# Patient Record
Sex: MALE | Race: WHITE | NOT HISPANIC OR LATINO
[De-identification: names, ages, dates, MRNs, and addresses within clinical notes are randomized per-mention and may not be internally consistent; named-entity substitution may affect disease eponyms.]

---

## 2017-03-31 PROBLEM — Z00.00 ENCOUNTER FOR PREVENTIVE HEALTH EXAMINATION: Status: ACTIVE | Noted: 2017-03-31

## 2017-04-04 ENCOUNTER — FORM ENCOUNTER (OUTPATIENT)
Age: 57
End: 2017-04-04

## 2017-04-05 ENCOUNTER — APPOINTMENT (OUTPATIENT)
Dept: ORTHOPEDIC SURGERY | Facility: CLINIC | Age: 57
End: 2017-04-05
Payer: COMMERCIAL

## 2017-04-05 ENCOUNTER — OUTPATIENT (OUTPATIENT)
Dept: OUTPATIENT SERVICES | Facility: HOSPITAL | Age: 57
LOS: 1 days | End: 2017-04-05
Payer: COMMERCIAL

## 2017-04-05 DIAGNOSIS — M12.571 TRAUMATIC ARTHROPATHY, RIGHT ANKLE AND FOOT: ICD-10-CM

## 2017-04-05 DIAGNOSIS — M25.571 PAIN IN RIGHT ANKLE AND JOINTS OF RIGHT FOOT: ICD-10-CM

## 2017-04-05 DIAGNOSIS — G89.29 PAIN IN RIGHT ANKLE AND JOINTS OF RIGHT FOOT: ICD-10-CM

## 2017-04-05 PROCEDURE — 73650 X-RAY EXAM OF HEEL: CPT

## 2017-04-05 PROCEDURE — 73630 X-RAY EXAM OF FOOT: CPT

## 2017-04-05 PROCEDURE — 73630 X-RAY EXAM OF FOOT: CPT | Mod: 26,RT

## 2017-04-05 PROCEDURE — 99203 OFFICE O/P NEW LOW 30 MIN: CPT

## 2017-04-29 PROBLEM — M25.571 CHRONIC PAIN OF RIGHT ANKLE: Status: ACTIVE | Noted: 2017-04-29

## 2017-04-29 PROBLEM — M12.571 POST-TRAUMATIC ARTHRITIS OF RIGHT ANKLE: Status: ACTIVE | Noted: 2017-04-29

## 2019-04-25 ENCOUNTER — APPOINTMENT (OUTPATIENT)
Dept: HEART AND VASCULAR | Facility: CLINIC | Age: 59
End: 2019-04-25
Payer: MEDICARE

## 2019-04-25 ENCOUNTER — NON-APPOINTMENT (OUTPATIENT)
Age: 59
End: 2019-04-25

## 2019-04-25 VITALS
BODY MASS INDEX: 25.77 KG/M2 | WEIGHT: 180 LBS | DIASTOLIC BLOOD PRESSURE: 84 MMHG | HEIGHT: 70 IN | HEART RATE: 66 BPM | SYSTOLIC BLOOD PRESSURE: 132 MMHG

## 2019-04-25 DIAGNOSIS — R07.9 CHEST PAIN, UNSPECIFIED: ICD-10-CM

## 2019-04-25 DIAGNOSIS — Z82.49 FAMILY HISTORY OF ISCHEMIC HEART DISEASE AND OTHER DISEASES OF THE CIRCULATORY SYSTEM: ICD-10-CM

## 2019-04-25 DIAGNOSIS — Z78.9 OTHER SPECIFIED HEALTH STATUS: ICD-10-CM

## 2019-04-25 DIAGNOSIS — I25.84 ATHEROSCLEROTIC HEART DISEASE OF NATIVE CORONARY ARTERY W/OUT ANGINA PECTORIS: ICD-10-CM

## 2019-04-25 DIAGNOSIS — I25.10 ATHEROSCLEROTIC HEART DISEASE OF NATIVE CORONARY ARTERY W/OUT ANGINA PECTORIS: ICD-10-CM

## 2019-04-25 PROCEDURE — 99204 OFFICE O/P NEW MOD 45 MIN: CPT | Mod: 25

## 2019-04-25 PROCEDURE — 93923 UPR/LXTR ART STDY 3+ LVLS: CPT

## 2019-04-25 PROCEDURE — 93000 ELECTROCARDIOGRAM COMPLETE: CPT

## 2019-04-26 NOTE — REVIEW OF SYSTEMS
[Chest Pain] : chest pain [Leg Claudication] : intermittent leg claudication [Recent Weight Gain (___ Lbs)] : recent [unfilled] ~Ulb weight gain [Headache] : headache [Feeling Fatigued] : feeling fatigued [Eyeglasses] : currently wearing eyeglasses [Joint Pain] : joint pain [Numbness (Hypesthesia)] : numbness [Memory Lapses Or Loss] : memory lapses or loss [Negative] : Heme/Lymph [Fever] : no fever [Chills] : no chills [Recent Weight Loss (___ Lbs)] : no recent weight loss [Shortness Of Breath] : no shortness of breath [Dyspnea on exertion] : not dyspnea during exertion [Chest  Pressure] : no chest pressure [Lower Ext Edema] : no extremity edema [Palpitations] : no palpitations [Cough] : no cough [Abdominal Pain] : no abdominal pain [Nausea] : no nausea [Vomiting] : no vomiting [Hematuria] : no hematuria [Nocturia] : no nocturia [Limb Weakness (Paresis)] : no limb weakness [Tremor] : no tremor was seen [Dizziness] : no dizziness [Confusion] : no confusion was observed [Convulsions] : no convulsions [Tingling (Paresthesia)] : no tingling [Anxiety] : no anxiety [Under Stress] : not under stress

## 2019-04-26 NOTE — HISTORY OF PRESENT ILLNESS
[FreeTextEntry1] : Here to set up care for chest pain. Occasionally left sided chest pain, non radiating, no associated symptoms. last 1-2 hours. gets it both at rest and with exertion. Last one multiple year ago. Left sided chest burning with diaphoreses. evaluated in ER. trop neg., had stress echo in 2017 negative.\par he cannot be more specific\par \par does walk 3 flights of stairs at home, only limited to left thigh pain. denies sob, bravo. \par baseline limping sp 2014 after surgeries. is reporting thigh pain with ambulation, relieves with rest for the past x1 year.\par also with RLE pain after walking - when he walks fast 1/2 block\par Currently on disability for leg trauma\par \par was once on Crestor 10 but stopped secondary to muscle pain, increased BP and ha. has not been taking in > 1 year, was on Livalo with good results but he stopped it \par smokes but since Jan trying to quit\par \par Given smoker, uncontrolled HLD, calcifications noted on CT of chest son is concerned for PT. Has followed up with another cardiologist with plans for NST however pt left practice. \par \par Stress echo 2017 normal\par MADELYN 4/2019 normal\par CT 03/2019 incidental cardiac calcifications noted\par Fasting labs 03/2019 elevated LDL, TRI, CHOl, and decreased HDL

## 2019-04-26 NOTE — DISCUSSION/SUMMARY
[FreeTextEntry1] : EKG:NSR,WNL\par reviewed records; note that echo in April- normal LVEF with mild-moderate MR.mild TR\par negative MADELYN in 2017\par negative echo stress\par labs feb:\par UO=522qj%,JY=780nd%,PVT=365dc%,HDL=37mg%\par has claudication- will get MADELYN and have him see vascular\par restart Livalo for lipids and recheck labs\par has CP- negative echo stress in 2107- Sx persist and possibly worse- he cannot exercise on treadmill so will need CCTA( has coronary calcifications on CT)\par MADELYN: + for RLE PVD\par DC smoking!

## 2019-04-26 NOTE — REASON FOR VISIT
[Initial Evaluation] : an initial evaluation of [Family Member] : family member [FreeTextEntry1] : CP,Lipid evaluation

## 2019-04-26 NOTE — PHYSICAL EXAM
[Normal Appearance] : normal appearance [General Appearance - Well Developed] : well developed [General Appearance - Well Nourished] : well nourished [Heart Sounds] : normal S1 and S2 [Heart Rate And Rhythm] : heart rate and rhythm were normal [Murmurs] : no murmurs present [Edema] : no peripheral edema present [Veins - Varicosity Changes] : no varicosital changes were noted in the lower extremities [] : no respiratory distress [Respiration, Rhythm And Depth] : normal respiratory rhythm and effort [Abdomen Soft] : soft [Exaggerated Use Of Accessory Muscles For Inspiration] : no accessory muscle use [Abdomen Tenderness] : non-tender [Well Groomed] : well groomed [Normal Oral Mucosa] : normal oral mucosa [Normal Conjunctiva] : the conjunctiva exhibited no abnormalities [No Jugular Venous Odonnell A Waves] : no jugular venous odonnell A waves [Normal Jugular Venous V Waves Present] : normal jugular venous V waves present [Normal Jugular Venous A Waves Present] : normal jugular venous A waves present [Bowel Sounds] : normal bowel sounds [Skin Color & Pigmentation] : normal skin color and pigmentation [Nail Clubbing] : no clubbing of the fingernails [Oriented To Time, Place, And Person] : oriented to person, place, and time [FreeTextEntry1] : not sufficient for treadmill

## 2019-04-30 ENCOUNTER — FORM ENCOUNTER (OUTPATIENT)
Age: 59
End: 2019-04-30

## 2019-05-01 ENCOUNTER — OUTPATIENT (OUTPATIENT)
Dept: OUTPATIENT SERVICES | Facility: HOSPITAL | Age: 59
LOS: 1 days | End: 2019-05-01
Payer: COMMERCIAL

## 2019-05-01 ENCOUNTER — APPOINTMENT (OUTPATIENT)
Dept: CT IMAGING | Facility: HOSPITAL | Age: 59
End: 2019-05-01
Payer: COMMERCIAL

## 2019-05-01 PROCEDURE — 75574 CT ANGIO HRT W/3D IMAGE: CPT | Mod: 26

## 2019-05-01 PROCEDURE — 75574 CT ANGIO HRT W/3D IMAGE: CPT

## 2019-05-02 ENCOUNTER — FORM ENCOUNTER (OUTPATIENT)
Age: 59
End: 2019-05-02

## 2019-05-02 ENCOUNTER — OUTPATIENT (OUTPATIENT)
Dept: OUTPATIENT SERVICES | Facility: HOSPITAL | Age: 59
LOS: 1 days | End: 2019-05-02
Payer: COMMERCIAL

## 2019-05-02 ENCOUNTER — CLINICAL ADVICE (OUTPATIENT)
Age: 59
End: 2019-05-02

## 2019-05-03 PROCEDURE — 0502T: CPT

## 2019-05-03 PROCEDURE — 0503T: CPT

## 2019-05-08 ENCOUNTER — APPOINTMENT (OUTPATIENT)
Dept: VASCULAR SURGERY | Facility: CLINIC | Age: 59
End: 2019-05-08
Payer: COMMERCIAL

## 2019-05-08 DIAGNOSIS — M79.604 PAIN IN RIGHT LEG: ICD-10-CM

## 2019-05-08 PROCEDURE — 99204 OFFICE O/P NEW MOD 45 MIN: CPT

## 2019-05-08 PROCEDURE — 93880 EXTRACRANIAL BILAT STUDY: CPT

## 2019-05-21 ENCOUNTER — APPOINTMENT (OUTPATIENT)
Dept: VASCULAR SURGERY | Facility: CLINIC | Age: 59
End: 2019-05-21
Payer: COMMERCIAL

## 2019-05-21 ENCOUNTER — NON-APPOINTMENT (OUTPATIENT)
Age: 59
End: 2019-05-21

## 2019-05-21 ENCOUNTER — APPOINTMENT (OUTPATIENT)
Dept: HEART AND VASCULAR | Facility: CLINIC | Age: 59
End: 2019-05-21
Payer: COMMERCIAL

## 2019-05-21 VITALS
SYSTOLIC BLOOD PRESSURE: 129 MMHG | DIASTOLIC BLOOD PRESSURE: 71 MMHG | OXYGEN SATURATION: 98 % | WEIGHT: 173 LBS | BODY MASS INDEX: 24.77 KG/M2 | HEIGHT: 70 IN | HEART RATE: 67 BPM

## 2019-05-21 PROCEDURE — 99214 OFFICE O/P EST MOD 30 MIN: CPT | Mod: 25

## 2019-05-21 PROCEDURE — 93978 VASCULAR STUDY: CPT

## 2019-05-21 PROCEDURE — 93000 ELECTROCARDIOGRAM COMPLETE: CPT

## 2019-05-21 NOTE — DISCUSSION/SUMMARY
[FreeTextEntry1] : EKG:NSR\par add EASA for CAD/PVD\par f/u with dr pruitt and walk 10+ blocks daily for pVD\par cont livalo for lipids. meds renewed

## 2019-05-21 NOTE — PHYSICAL EXAM
[General Appearance - Well Developed] : well developed [Normal Appearance] : normal appearance [Well Groomed] : well groomed [General Appearance - Well Nourished] : well nourished [Normal Conjunctiva] : the conjunctiva exhibited no abnormalities [Normal Oral Mucosa] : normal oral mucosa [Normal Jugular Venous A Waves Present] : normal jugular venous A waves present [Normal Jugular Venous V Waves Present] : normal jugular venous V waves present [No Jugular Venous Odonnell A Waves] : no jugular venous odonnell A waves [] : no respiratory distress [Respiration, Rhythm And Depth] : normal respiratory rhythm and effort [Exaggerated Use Of Accessory Muscles For Inspiration] : no accessory muscle use [Auscultation Breath Sounds / Voice Sounds] : lungs were clear to auscultation bilaterally [Heart Rate And Rhythm] : heart rate and rhythm were normal [Heart Sounds] : normal S1 and S2 [Murmurs] : no murmurs present [Edema] : no peripheral edema present [FreeTextEntry1] : decreased pulse RLE [Bowel Sounds] : normal bowel sounds [Abdomen Soft] : soft [Abdomen Tenderness] : non-tender [Abnormal Walk] : normal gait [Nail Clubbing] : no clubbing of the fingernails [Skin Color & Pigmentation] : normal skin color and pigmentation [Oriented To Time, Place, And Person] : oriented to person, place, and time

## 2019-05-21 NOTE — REVIEW OF SYSTEMS
[Feeling Fatigued] : feeling fatigued [Eyeglasses] : currently wearing eyeglasses [Leg Claudication] : intermittent leg claudication [Joint Pain] : joint pain [Numbness (Hypesthesia)] : numbness [Memory Lapses Or Loss] : memory lapses or loss [Negative] : Heme/Lymph [Fever] : no fever [Headache] : no headache [Recent Weight Gain (___ Lbs)] : no recent weight gain [Chills] : no chills [Recent Weight Loss (___ Lbs)] : no recent weight loss [Shortness Of Breath] : no shortness of breath [Dyspnea on exertion] : not dyspnea during exertion [Chest  Pressure] : no chest pressure [Chest Pain] : no chest pain [Lower Ext Edema] : no extremity edema [Palpitations] : no palpitations [Cough] : no cough [Abdominal Pain] : no abdominal pain [Nausea] : no nausea [Vomiting] : no vomiting [Hematuria] : no hematuria [Nocturia] : no nocturia [Limb Weakness (Paresis)] : no limb weakness [Dizziness] : no dizziness [Tremor] : no tremor was seen [Convulsions] : no convulsions [Tingling (Paresthesia)] : no tingling [Confusion] : no confusion was observed [Anxiety] : no anxiety [Under Stress] : not under stress

## 2019-05-23 ENCOUNTER — MEDICATION RENEWAL (OUTPATIENT)
Age: 59
End: 2019-05-23

## 2019-07-08 ENCOUNTER — FORM ENCOUNTER (OUTPATIENT)
Age: 59
End: 2019-07-08

## 2019-07-09 ENCOUNTER — APPOINTMENT (OUTPATIENT)
Dept: CT IMAGING | Facility: HOSPITAL | Age: 59
End: 2019-07-09
Payer: COMMERCIAL

## 2019-07-09 ENCOUNTER — OUTPATIENT (OUTPATIENT)
Dept: OUTPATIENT SERVICES | Facility: HOSPITAL | Age: 59
LOS: 1 days | End: 2019-07-09
Payer: COMMERCIAL

## 2019-07-09 PROCEDURE — 75635 CT ANGIO ABDOMINAL ARTERIES: CPT | Mod: 26

## 2019-07-09 PROCEDURE — 75635 CT ANGIO ABDOMINAL ARTERIES: CPT

## 2019-07-09 NOTE — HISTORY OF PRESENT ILLNESS
[FreeTextEntry1] : 59 year old male former smoker (for 40yrs, <1 pack/day, quit ~1.5 weeks ago) with HLD and h/o RLE injury with calcaneus bone fracture s/p repair by Dr. Figueroa, who presents today for initial evaluation of RLE pain, referred by Dr. Wong. He reports pain in the R leg when he walks that began a few years ago. He reports that the pain used to be localized towards the groin area, thigh and calf, but now it's localized to the thigh and calf. He can walk about 3-4 blocks without pain if he walks slowly and only about 1 block if he walks quickly. He denies rest pain and ulcerations. He is not taking ASA.\par \par Patient is retired but used to work as a . He presents with his son who is an ER nurse here and has plans to attend medical school. Pt's parents passed away from kidney problems and they both had T2DM.

## 2019-07-09 NOTE — ASSESSMENT
[Arterial/Venous Disease] : arterial/venous disease [Foot care/Footwear] : foot care/footwear [FreeTextEntry1] : 58 y/o M former smoker with hx of HLD and RLE injury with calcaneus bone fracture s/p repair many yrs ago, who presents with claudication of the right leg. Previous MADELYN/PVRs reviewed and c/w RLE arterial insufficiency and occlusive disease (likely at the iliac level) given flattened waveforms throughout MADELYN of 0.61. B/l carotid US demonstrates <50% stenosis bilaterally. On physical exam, patient with no palpable RLE pulses, including no right femoral artery pulse so I suspect he has a blockage in the vessels leading to the right leg. I will ordered a CTA A/P/LEs to evaluate of the iliac arteries to further investigate possible blockage. I advised him to begin a walking regimen and to try to walk at least 10 blocks a day. He is already relatively active but should walk on a daily basis. When he begins to feel pain, he should stop and rest until the pain subsides, and then continue walking again. He will follow up after the CTA is completed.

## 2019-07-09 NOTE — DATA REVIEWED
[FreeTextEntry1] : MADELYN/PVR from Dr Wong:\par RLE with flattened waveform throughout, MADELYN of 0.69 at high thigh level, 0.63 at low thigh level, 0.62 at calf level, 0.61 and 0.72 at PT and DP level respectively. \par LLE with normal waveforms and ABIs of 1.11, 1.16, 1.08, 1.06 and 1.05 in same order mentioned for RLE.

## 2019-07-09 NOTE — ADDENDUM
[FreeTextEntry1] : This note was written by Jennifer Bui on 05/08/2019  acting as scribe for Dr. Howell.

## 2019-07-09 NOTE — END OF VISIT
[FreeTextEntry3] : All medical record entries made by the Scribe were at my, Dr. Howell's, discretion and personally dictated by me on 05/08/2019. I have reviewed the chart and agree that the record accurately reflects my personal performance of the history, physical exam, assessment and plan. I have also personally directed, reviewed and agreed to the chart.

## 2019-07-09 NOTE — PHYSICAL EXAM
[Respiratory Effort] : normal respiratory effort [Alert] : alert [Calm] : calm [2+] : left 2+ [0] : right 0 [No Rash or Lesion] : No rash or lesion [JVD] : no jugular venous distention  [Carotid Bruits] : no carotid bruits [Ankle Swelling (On Exam)] : not present [Varicose Veins Of Lower Extremities] : not present [] : not present [de-identified] : Calm, cooperative [de-identified] : NCAT [de-identified] : soft, non distended [de-identified] : FROM

## 2019-07-10 ENCOUNTER — APPOINTMENT (OUTPATIENT)
Dept: VASCULAR SURGERY | Facility: CLINIC | Age: 59
End: 2019-07-10
Payer: COMMERCIAL

## 2019-07-10 VITALS — SYSTOLIC BLOOD PRESSURE: 108 MMHG | DIASTOLIC BLOOD PRESSURE: 75 MMHG | HEART RATE: 62 BPM

## 2019-07-10 DIAGNOSIS — R09.89 OTHER SPECIFIED SYMPTOMS AND SIGNS INVOLVING THE CIRCULATORY AND RESPIRATORY SYSTEMS: ICD-10-CM

## 2019-07-10 DIAGNOSIS — I73.9 PERIPHERAL VASCULAR DISEASE, UNSPECIFIED: ICD-10-CM

## 2019-07-10 PROCEDURE — 99214 OFFICE O/P EST MOD 30 MIN: CPT

## 2019-08-07 NOTE — ASSESSMENT
Arterial/Venous Disease] : arterial/venous disease [Foot care/Footwear] : foot care/footwear [FreeTextEntry1] : 60 y/o M former smoker with hx of HLD and RLE injury with calcaneus bone fracture s/p repair many yrs ago, who presents for follow up for his RLE claudication. Previously ordered CTA A/P/LEs reviewed today shows an occluded right common iliac artery with reconstitution of the external and internal iliac arteries. I advised him to continue his walking regimen to further improve his RLE claudication. Patient was again let known on the risks of surgical intervention with arterial stents. Given his improvement, this is not warranted for now. He is encouraged to wear comfortable shoes to relieve pressure from his foot. He is further advised to see a podiatrist for this. Will return here in 3 months for his next follow up.

## 2019-08-07 NOTE — HISTORY OF PRESENT ILLNESS
[FreeTextEntry1] : 59 year old male former smoker (for 40yrs, <1 pack/day, quit ~1.5 weeks ago) with HLD and h/o RLE injury with calcaneus bone fracture s/p repair by Dr. Figueroa, who presents today for follow up evaluation of RLE claudication, referred by Dr. Wong. He reports that he now walks 6000 steps daily and the pain in the R leg has resolved. He does state that he experiences pain on his foot but associates this with his of PSHx of foot surgery. Denies rest pain and ulcerations. He reports completing his CTA yesterday and would like to review the report/images. \par \par Patient is retired but used to work as a . He presents with his son who is an ER nurse here and has plans to attend medical school. Pt's parents passed away from kidney problems and they both had T2DM.

## 2019-08-07 NOTE — END OF VISIT
[FreeTextEntry3] : All medical record entries made by the Scribe were at my, Dr. Howell's direction and personally dictated by me on 07/10/2019 I have reviewed the chart and agree that the record accurately reflects my personal performance of the history, physical exam, assessment and plan. I have also personally directed, reviewed, and agreed with the chart.\par

## 2019-08-07 NOTE — ADDENDUM
[FreeTextEntry1] : Documented by Marcela Newell acting as a scribe for Dr. Domingo Howell on 07/10/2019\par

## 2019-08-07 NOTE — PHYSICAL EXAM
[Respiratory Effort] : normal respiratory effort [Alert] : alert [No Rash or Lesion] : No rash or lesion [Calm] : calm [Oriented to Person] : oriented to person [Oriented to Place] : oriented to place [Oriented to Time] : oriented to time [JVD] : no jugular venous distention  [Ankle Swelling (On Exam)] : not present [Varicose Veins Of Lower Extremities] : not present [] : not present [de-identified] : Well appearing, NAD [de-identified] : NCAT [de-identified] : soft, non distended [de-identified] : FROM

## 2019-08-07 NOTE — DATA REVIEWED
[FreeTextEntry1] : Abdominal CTA with runoff (07/09/2019)\par Impression: Occluded right common iliac artery with reconstitution of the external and internal iliac arteries.\par \par

## 2019-08-20 ENCOUNTER — APPOINTMENT (OUTPATIENT)
Dept: HEART AND VASCULAR | Facility: CLINIC | Age: 59
End: 2019-08-20
Payer: MEDICARE

## 2019-08-20 ENCOUNTER — NON-APPOINTMENT (OUTPATIENT)
Age: 59
End: 2019-08-20

## 2019-08-20 VITALS
DIASTOLIC BLOOD PRESSURE: 78 MMHG | WEIGHT: 167 LBS | HEIGHT: 70 IN | SYSTOLIC BLOOD PRESSURE: 118 MMHG | BODY MASS INDEX: 23.91 KG/M2

## 2019-08-20 VITALS — HEART RATE: 60 BPM

## 2019-08-20 PROCEDURE — 99214 OFFICE O/P EST MOD 30 MIN: CPT | Mod: 25

## 2019-08-20 PROCEDURE — 93000 ELECTROCARDIOGRAM COMPLETE: CPT

## 2019-08-20 NOTE — PHYSICAL EXAM
[Normal Appearance] : normal appearance [General Appearance - Well Developed] : well developed [Well Groomed] : well groomed [Normal Conjunctiva] : the conjunctiva exhibited no abnormalities [General Appearance - Well Nourished] : well nourished [Normal Oral Mucosa] : normal oral mucosa [Normal Jugular Venous A Waves Present] : normal jugular venous A waves present [Normal Jugular Venous V Waves Present] : normal jugular venous V waves present [No Jugular Venous Odonnell A Waves] : no jugular venous odonnell A waves [] : no respiratory distress [Respiration, Rhythm And Depth] : normal respiratory rhythm and effort [Exaggerated Use Of Accessory Muscles For Inspiration] : no accessory muscle use [Auscultation Breath Sounds / Voice Sounds] : lungs were clear to auscultation bilaterally [Heart Rate And Rhythm] : heart rate and rhythm were normal [Heart Sounds] : normal S1 and S2 [Murmurs] : no murmurs present [Edema] : no peripheral edema present [FreeTextEntry1] : decreased pulse RLE [Bowel Sounds] : normal bowel sounds [Abdomen Soft] : soft [Abdomen Tenderness] : non-tender [Abnormal Walk] : normal gait [Nail Clubbing] : no clubbing of the fingernails [Skin Color & Pigmentation] : normal skin color and pigmentation [Oriented To Time, Place, And Person] : oriented to person, place, and time

## 2019-08-20 NOTE — REVIEW OF SYSTEMS
[Fever] : no fever [Headache] : no headache [Recent Weight Gain (___ Lbs)] : no recent weight gain [Feeling Fatigued] : not feeling fatigued [Chills] : no chills [Recent Weight Loss (___ Lbs)] : no recent weight loss [Eyeglasses] : currently wearing eyeglasses [Shortness Of Breath] : no shortness of breath [see HPI] : see HPI [Dyspnea on exertion] : not dyspnea during exertion [Chest Pain] : no chest pain [Chest  Pressure] : no chest pressure [Lower Ext Edema] : no extremity edema [Leg Claudication] : no intermittent leg claudication [Palpitations] : no palpitations [Cough] : no cough [Abdominal Pain] : no abdominal pain [Nausea] : no nausea [Vomiting] : no vomiting [Hematuria] : no hematuria [Nocturia] : no nocturia [Joint Pain] : joint pain [Limb Weakness (Paresis)] : no limb weakness [Dizziness] : no dizziness [Tremor] : no tremor was seen [Convulsions] : no convulsions [Numbness (Hypesthesia)] : numbness [Tingling (Paresthesia)] : no tingling [Confusion] : no confusion was observed [Memory Lapses Or Loss] : memory lapses or loss [Anxiety] : no anxiety [Under Stress] : not under stress [Negative] : Heme/Lymph

## 2019-08-23 ENCOUNTER — MEDICATION RENEWAL (OUTPATIENT)
Age: 59
End: 2019-08-23

## 2019-12-03 ENCOUNTER — APPOINTMENT (OUTPATIENT)
Dept: HEART AND VASCULAR | Facility: CLINIC | Age: 59
End: 2019-12-03
Payer: COMMERCIAL

## 2019-12-03 ENCOUNTER — NON-APPOINTMENT (OUTPATIENT)
Age: 59
End: 2019-12-03

## 2019-12-03 VITALS
HEIGHT: 70 IN | HEART RATE: 61 BPM | BODY MASS INDEX: 24.48 KG/M2 | WEIGHT: 171 LBS | SYSTOLIC BLOOD PRESSURE: 120 MMHG | DIASTOLIC BLOOD PRESSURE: 82 MMHG

## 2019-12-03 DIAGNOSIS — I73.9 PERIPHERAL VASCULAR DISEASE, UNSPECIFIED: ICD-10-CM

## 2019-12-03 PROCEDURE — 93306 TTE W/DOPPLER COMPLETE: CPT

## 2019-12-03 PROCEDURE — 93000 ELECTROCARDIOGRAM COMPLETE: CPT

## 2019-12-03 PROCEDURE — 99214 OFFICE O/P EST MOD 30 MIN: CPT | Mod: 25

## 2019-12-03 RX ORDER — AZITHROMYCIN 250 MG/1
250 TABLET, FILM COATED ORAL
Qty: 6 | Refills: 0 | Status: DISCONTINUED | COMMUNITY
Start: 2019-03-21 | End: 2019-12-03

## 2019-12-03 NOTE — HISTORY OF PRESENT ILLNESS
[FreeTextEntry1] : saw Dr Howell for PVD- no c/o claudication\par no cp,sob\par had labs 2 weeks ago

## 2019-12-03 NOTE — REVIEW OF SYSTEMS
[Recent Weight Gain (___ Lbs)] : recent [unfilled] ~Ulb weight gain [Eyeglasses] : currently wearing eyeglasses [see HPI] : see HPI [Nocturia] : nocturia [Joint Pain] : joint pain [Lower Back Pain] : lower back pain [Ankle Problem] : ankle problems [Numbness (Hypesthesia)] : numbness [Memory Lapses Or Loss] : memory lapses or loss [Negative] : Heme/Lymph [Fever] : no fever [Headache] : no headache [Chills] : no chills [Feeling Fatigued] : not feeling fatigued [Recent Weight Loss (___ Lbs)] : no recent weight loss [Shortness Of Breath] : no shortness of breath [Chest  Pressure] : no chest pressure [Dyspnea on exertion] : not dyspnea during exertion [Lower Ext Edema] : no extremity edema [Chest Pain] : no chest pain [Palpitations] : no palpitations [Leg Claudication] : no intermittent leg claudication [Nausea] : no nausea [Abdominal Pain] : no abdominal pain [Cough] : no cough [Vomiting] : no vomiting [Hematuria] : no hematuria [Dizziness] : no dizziness [Limb Weakness (Paresis)] : no limb weakness [Tremor] : no tremor was seen [Tingling (Paresthesia)] : no tingling [Convulsions] : no convulsions [Confusion] : no confusion was observed [Anxiety] : no anxiety [Under Stress] : not under stress

## 2019-12-03 NOTE — PHYSICAL EXAM
[General Appearance - Well Developed] : well developed [Normal Appearance] : normal appearance [Well Groomed] : well groomed [Normal Oral Mucosa] : normal oral mucosa [General Appearance - Well Nourished] : well nourished [Normal Conjunctiva] : the conjunctiva exhibited no abnormalities [Normal Jugular Venous A Waves Present] : normal jugular venous A waves present [Normal Jugular Venous V Waves Present] : normal jugular venous V waves present [No Jugular Venous Odonnell A Waves] : no jugular venous odonnell A waves [] : no respiratory distress [Respiration, Rhythm And Depth] : normal respiratory rhythm and effort [Exaggerated Use Of Accessory Muscles For Inspiration] : no accessory muscle use [Auscultation Breath Sounds / Voice Sounds] : lungs were clear to auscultation bilaterally [Heart Rate And Rhythm] : heart rate and rhythm were normal [Heart Sounds] : normal S1 and S2 [Edema] : no peripheral edema present [Murmurs] : no murmurs present [Abdomen Tenderness] : non-tender [Abdomen Soft] : soft [Bowel Sounds] : normal bowel sounds [Nail Clubbing] : no clubbing of the fingernails [Abnormal Walk] : normal gait [Oriented To Time, Place, And Person] : oriented to person, place, and time [Skin Color & Pigmentation] : normal skin color and pigmentation [FreeTextEntry1] : intact LLE pulse/ absent RLE pulse- feet warm

## 2019-12-03 NOTE — DISCUSSION/SUMMARY
[FreeTextEntry1] : EKG:NSR\par reviewed labs done 11/20: CA=564ko%,DWX=522 mg%\par pt states was off his diet and was off Livalo for a week\par advised cont Livalo and repeat labs in 6-8 weeks( gave rx for labs)\par  cont walking and f/u with Dr alexandra for PVD( denies claudication now)\par asa for elevated calcium score- states had side effects with lipitor in past\par meds renewed\par echo:\par normal LVEF, mild-moderate MR

## 2019-12-10 ENCOUNTER — RESULT REVIEW (OUTPATIENT)
Age: 59
End: 2019-12-10

## 2020-05-20 ENCOUNTER — APPOINTMENT (OUTPATIENT)
Dept: VASCULAR SURGERY | Facility: CLINIC | Age: 60
End: 2020-05-20

## 2020-08-10 ENCOUNTER — APPOINTMENT (OUTPATIENT)
Dept: HEART AND VASCULAR | Facility: CLINIC | Age: 60
End: 2020-08-10

## 2020-10-28 ENCOUNTER — APPOINTMENT (OUTPATIENT)
Dept: VASCULAR SURGERY | Facility: CLINIC | Age: 60
End: 2020-10-28
Payer: COMMERCIAL

## 2020-10-28 DIAGNOSIS — R20.0 ANESTHESIA OF SKIN: ICD-10-CM

## 2020-10-28 DIAGNOSIS — R20.2 ANESTHESIA OF SKIN: ICD-10-CM

## 2020-10-28 PROCEDURE — 99072 ADDL SUPL MATRL&STAF TM PHE: CPT

## 2020-10-28 PROCEDURE — 99214 OFFICE O/P EST MOD 30 MIN: CPT

## 2020-11-02 NOTE — ASSESSMENT
[FreeTextEntry1] : 61 y/o M w/ mild PAD w/ occluded right common iliac artery with reconstitution of the external and internal iliac arteries, asymptomatic. On exam, general exam benign. Discussed with patient his b/l upper extremity symptoms are likely secondary to a nerve impingement or neuropathy, not of vascular etiology. I have recommended pt see a neurologist, Dr Gordon. Recommended pt to continue to stay active, daily walking and to follow up with us here in 1 month.

## 2020-11-02 NOTE — HISTORY OF PRESENT ILLNESS
[FreeTextEntry1] : 61 y/o M former smoker (for 40yrs, <1 pack/day, quit ~1.5 weeks ago) with HLD, h/o RLE injury with calcaneus bone fracture s/p repair by Dr. Figueroa, hx of chronic back issues, and PAD, RLE claudication. Patient was originally referred by Dr. Wong. Today, he reports his RLE claudication symptoms have resolved. During the summer he reports walking ~>10 blocks/day without stopping and lost some weight. However, pt reports he has not remained as active recently and c/o intermittent numbness sensation to b/l soles of his feet. Also, pt reports constant burning, coldness, numbness/ tingling sensation to the b/l hands with intermittent swelling since ~1 week ago. He does report at times, depending on the position he sleeps in, he also experiences some neck discomfort or pain. Denies any recent trauma or falls, fevers, chills, skin changes, ulcerations, leg swelling, rest pain or claudication. \par \par Pt is accompanied by wife. Patient reports his son who was an ER Nurse just started attending Medical School. \par \par SHx:\par Patient is retired but used to work as a . \par \par FHx:\par Pt's parents passed away from kidney problems and they both had T2DM.\par

## 2020-11-02 NOTE — PHYSICAL EXAM
[Respiratory Effort] : normal respiratory effort [No Rash or Lesion] : No rash or lesion [Alert] : alert [Oriented to Person] : oriented to person [Oriented to Place] : oriented to place [Oriented to Time] : oriented to time [Calm] : calm [Normal Rate and Rhythm] : normal rate and rhythm [2+] : left 2+ [JVD] : no jugular venous distention  [Ankle Swelling (On Exam)] : not present [Varicose Veins Of Lower Extremities] : not present [] : not present [de-identified] : Well appearing, NAD [de-identified] : NCAT [de-identified] : Supple [de-identified] : soft, non distended [de-identified] : FROM

## 2020-11-02 NOTE — ADDENDUM
[FreeTextEntry1] : This note was written by Dayana Flores on 10/28/2020 acting as scribe for Domingo Ashley M.D.\par \par I, Domingo Snow  have read and attest that all the information, medical decision making and discharge instructions within are true and accurate.

## 2020-11-06 ENCOUNTER — APPOINTMENT (OUTPATIENT)
Dept: NEUROLOGY | Facility: CLINIC | Age: 60
End: 2020-11-06
Payer: COMMERCIAL

## 2020-11-06 VITALS
BODY MASS INDEX: 23.34 KG/M2 | WEIGHT: 163 LBS | SYSTOLIC BLOOD PRESSURE: 120 MMHG | HEIGHT: 70 IN | HEART RATE: 58 BPM | DIASTOLIC BLOOD PRESSURE: 75 MMHG

## 2020-11-06 DIAGNOSIS — F17.200 NICOTINE DEPENDENCE, UNSPECIFIED, UNCOMPLICATED: ICD-10-CM

## 2020-11-06 PROCEDURE — 99205 OFFICE O/P NEW HI 60 MIN: CPT

## 2020-11-06 NOTE — PHYSICAL EXAM
[General Appearance - Alert] : alert [General Appearance - In No Acute Distress] : in no acute distress [Person] : oriented to person [Place] : oriented to place [Time] : oriented to time [Short Term Intact] : short term memory intact [Remote Intact] : remote memory intact [Registration Intact] : recent registration memory intact [Concentration Intact] : normal concentrating ability [Visual Intact] : visual attention was ~T not ~L decreased [Naming Objects] : no difficulty naming common objects [Repeating Phrases] : no difficulty repeating a phrase [Writing A Sentence] : no difficulty writing a sentence [Fluency] : fluency intact [Comprehension] : comprehension intact [Reading] : reading intact [Past History] : adequate knowledge of personal past history [Cranial Nerves Optic (II)] : visual acuity intact bilaterally,  visual fields full to confrontation, pupils equal round and reactive to light [Cranial Nerves Oculomotor (III)] : extraocular motion intact [Cranial Nerves Trigeminal (V)] : facial sensation intact symmetrically [Cranial Nerves Facial (VII)] : face symmetrical [Cranial Nerves Vestibulocochlear (VIII)] : hearing was intact bilaterally [Cranial Nerves Glossopharyngeal (IX)] : tongue and palate midline [Cranial Nerves Accessory (XI - Cranial And Spinal)] : head turning and shoulder shrug symmetric [Cranial Nerves Hypoglossal (XII)] : there was no tongue deviation with protrusion [Motor Strength] : muscle strength was normal in all four extremities [No Muscle Atrophy] : normal bulk in all four extremities [Motor Handedness Right-Handed] : the patient is right hand dominant [Sensation Tactile Decrease] : light touch was intact [Vibration Decrease Leg / Foot Both Ankles] : decreased at both ankles [Vibration Decrease Leg / Foot Toes Both Feet] : decreased at the toes of both feet  [Position Sensation Decrease Leg/Foot At Level Of Toes] : impaired at the toes in both legs [Abnormal Walk] : normal gait [Balance] : balance was intact [Past-pointing] : there was no past-pointing [Tremor] : no tremor present [4+] : Patella left 4+ [2+] : Ankle jerk right 2+ [3+] : Ankle jerk left 3+ [Plantar Reflex Right Only] : normal on the right [Plantar Reflex Left Only] : abnormal on the left [FreeTextEntry6] : slightly increased tone LE's [FreeTextEntry7] : vib loss to ASIS

## 2020-11-06 NOTE — HISTORY OF PRESENT ILLNESS
[FreeTextEntry1] : Patient presents for evaluation of burning sensation.  He states that about 4-5 weeks ago he started feeling burning and tingling with some numbness bilateral dorsal aspect of hands, more toward medial side.  Palmar side normal.  About the same time he started having similar symptoms soles of feet but much less notable.  This has happened for the last 4-5 autumns and through the winter months but not as severe.  He has some chronic neck pain but minimizes it, worse in the morning after sleeping.  \par \par Diclofenac helped recently.  \par \par He denies weakness of hands or legs.  Denies significant FH.

## 2020-11-06 NOTE — ASSESSMENT
[FreeTextEntry1] : Patient's presentation and examination is most consistent with cervical myelopathy likely due to spondylosis. \par \par Will get MRI C spine and check Vit B12, MMA, homocysteine, folate, copper\par \par Mobic 15mg QD\par \par I will call with results.

## 2020-11-07 LAB
FOLATE SERPL-MCNC: 12.9 NG/ML
HCYS SERPL-MCNC: 16 UMOL/L
VIT B12 SERPL-MCNC: 547 PG/ML

## 2020-11-10 ENCOUNTER — APPOINTMENT (OUTPATIENT)
Dept: NEUROLOGY | Facility: CLINIC | Age: 60
End: 2020-11-10

## 2020-11-11 LAB — COPPER SERPL-MCNC: 100 UG/DL

## 2020-11-13 ENCOUNTER — APPOINTMENT (OUTPATIENT)
Dept: NEUROLOGY | Facility: CLINIC | Age: 60
End: 2020-11-13

## 2020-11-13 LAB — METHYLMALONATE SERPL-SCNC: 137 NMOL/L

## 2020-11-18 ENCOUNTER — RESULT REVIEW (OUTPATIENT)
Age: 60
End: 2020-11-18

## 2020-11-18 ENCOUNTER — APPOINTMENT (OUTPATIENT)
Dept: MRI IMAGING | Facility: CLINIC | Age: 60
End: 2020-11-18
Payer: COMMERCIAL

## 2020-11-18 ENCOUNTER — OUTPATIENT (OUTPATIENT)
Dept: OUTPATIENT SERVICES | Facility: HOSPITAL | Age: 60
LOS: 1 days | End: 2020-11-18

## 2020-11-18 PROCEDURE — 72141 MRI NECK SPINE W/O DYE: CPT | Mod: 26

## 2020-11-20 ENCOUNTER — TRANSCRIPTION ENCOUNTER (OUTPATIENT)
Age: 60
End: 2020-11-20

## 2020-12-01 ENCOUNTER — OUTPATIENT (OUTPATIENT)
Dept: OUTPATIENT SERVICES | Facility: HOSPITAL | Age: 60
LOS: 1 days | End: 2020-12-01
Payer: COMMERCIAL

## 2020-12-01 ENCOUNTER — APPOINTMENT (OUTPATIENT)
Dept: ORTHOPEDIC SURGERY | Facility: CLINIC | Age: 60
End: 2020-12-01
Payer: COMMERCIAL

## 2020-12-01 ENCOUNTER — RESULT REVIEW (OUTPATIENT)
Age: 60
End: 2020-12-01

## 2020-12-01 VITALS
BODY MASS INDEX: 23.19 KG/M2 | HEIGHT: 70 IN | HEART RATE: 70 BPM | DIASTOLIC BLOOD PRESSURE: 68 MMHG | WEIGHT: 162 LBS | TEMPERATURE: 97 F | OXYGEN SATURATION: 98 % | SYSTOLIC BLOOD PRESSURE: 110 MMHG

## 2020-12-01 PROCEDURE — 72050 X-RAY EXAM NECK SPINE 4/5VWS: CPT

## 2020-12-01 PROCEDURE — 99204 OFFICE O/P NEW MOD 45 MIN: CPT

## 2020-12-01 PROCEDURE — 72050 X-RAY EXAM NECK SPINE 4/5VWS: CPT | Mod: 26

## 2020-12-04 NOTE — PHYSICAL EXAM
[de-identified] : Physical Exam:\par \par General: patient is well developed, well nourished, in no acute \par distress, alert and oriented x 3. \par \par Mood and affect: normal\par \par Respiratory: no respiratory distress noted\par \par Skin: no scars over spine, skin intact, no erythema, increased warmth\par \par Alignment:The spine is well compensated in the coronal and sagittal plane. There is no asymmetry on Christine Forward Bend Test.\par \par Gait: The patient is able to toe walk and heel walk without difficulty. The patient is able to tandem walk without difficulty.\par \par Palpation: no tenderness to palpation midline along spine or paraspinal region\par \par Range of motion: Cervical and Lumbar spine ROM is full\par \par Neurologic Exam:\par Motor: Manual Muscle testing in the upper and lower extremities is 5 out of 5 in all muscle groups. There is no evidence of muscular atrophy in the upper extremities. Sensory: Sensation to light touch is grossly intact in the upper and lower extremities\par \par Reflexes: DTR are present and symmetric throughout, negative humphrey bilaterally, negative inverted radial reflex bilaterally, no clonus, plantar responses are flexor\par \par Special tests: Spurlings sign absent. Lhermitte's sign is absent. Straight Leg Raise Negative, Cross Straight Leg Raise Negative, DEEP Test Negative\par \par Hip Exam: Full painless ROM of bilateral hips\par \par Vascular: Examination of the peripheral vascular system demonstrates no evidence of congestion or edema. no lymphedema bilateral lower extremities, pulses are present and symmetric in both lower extremities.\par \par \par  [de-identified] : MRI cervical 11/2020: C4/C5 and C5/C6 disc osteophyte and posterior element hypertrophy resulting in severe central canal stenosis and cord compression, moderate/severe bilateral foraminal stenosis; cord signal change at these levels consistent with myelomalacia

## 2020-12-04 NOTE — HISTORY OF PRESENT ILLNESS
[de-identified] : Mr. DE LEON is a very pleasant 60 year old male who complains of stiffness and burning sensation in right greater than left hand, intermittently for years, now constand over the past 2 months. Also with neck pain, nonradiating. \par \par The patient reports no loss of hand dexterity.\par The patient states there no loss of balance when walking.\par There no sensory loss in the arms or legs\par The patent no difficulty with urination.\par \par The patient no history of previous spine surgery.\par \par The patient has no history of unexpected weight loss, no history of active cancer, no history bladder or bowel dysfunction, no night pain, no fevers or chills.\par \par The past medical history, surgical history, family history, allergies, medications, 10+ point review of systems, family history and social history were reviewed and non contributory.\par \par

## 2020-12-04 NOTE — DISCUSSION/SUMMARY
[de-identified] : Discussed the results of the patient's history, physical exam, and imaging. Mr. Kaye has been suffering from neck pain and stiffness/burning paresthesias intermittently for years, now more constant over the last 2 months. MRI cervical shows C4/C5 and C5/C6 disc osteophyte and posterior element hypertrophy resulting in severe central canal stenosis and cord compression, moderate/severe bilateral foraminal stenosis; cord signal change at these levels consistent with myelomalacia. Explained to patient that this will require surgical decompression. I am recommending a CT cervical without contrast, to be completed this week. The patient will follow up with me after imaging is completed for surgical planning, sooner if there is an issue. Will discuss anterior vs posterior surgical approach pending results of CT cervical. All questions answered.

## 2020-12-09 ENCOUNTER — APPOINTMENT (OUTPATIENT)
Dept: SPINE | Facility: CLINIC | Age: 60
End: 2020-12-09

## 2020-12-09 ENCOUNTER — APPOINTMENT (OUTPATIENT)
Dept: CT IMAGING | Facility: IMAGING CENTER | Age: 60
End: 2020-12-09
Payer: COMMERCIAL

## 2020-12-09 ENCOUNTER — OUTPATIENT (OUTPATIENT)
Dept: OUTPATIENT SERVICES | Facility: HOSPITAL | Age: 60
LOS: 1 days | End: 2020-12-09
Payer: COMMERCIAL

## 2020-12-09 DIAGNOSIS — M48.02 SPINAL STENOSIS, CERVICAL REGION: ICD-10-CM

## 2020-12-09 PROCEDURE — 72125 CT NECK SPINE W/O DYE: CPT

## 2020-12-09 PROCEDURE — 72125 CT NECK SPINE W/O DYE: CPT | Mod: 26

## 2020-12-16 ENCOUNTER — APPOINTMENT (OUTPATIENT)
Dept: ORTHOPEDIC SURGERY | Facility: CLINIC | Age: 60
End: 2020-12-16
Payer: COMMERCIAL

## 2020-12-16 PROCEDURE — 99215 OFFICE O/P EST HI 40 MIN: CPT | Mod: 57

## 2020-12-16 PROCEDURE — 99072 ADDL SUPL MATRL&STAF TM PHE: CPT

## 2020-12-18 ENCOUNTER — NON-APPOINTMENT (OUTPATIENT)
Age: 60
End: 2020-12-18

## 2020-12-21 NOTE — HISTORY OF PRESENT ILLNESS
[de-identified] : Follow up 12/16/20: Patient presents for follow up. Continues to have stiffness and burning sensation in right greater than left hand. Also with neck pain, nonradiating. Denies new neurologic symptoms. Here to review imaging.\par \par Initial 12/1/20: Mr. DE LEON is a very pleasant 60 year old male who complains of stiffness and burning sensation in right greater than left hand, intermittently for years, now constant over the past 2 months. Also with neck pain, nonradiating. \par \par The patient reports no loss of hand dexterity.\par The patient states there no loss of balance when walking.\par There no sensory loss in the arms or legs\par The patent no difficulty with urination.\par \par The patient no history of previous spine surgery.\par \par The patient has no history of unexpected weight loss, no history of active cancer, no history bladder or bowel dysfunction, no night pain, no fevers or chills.\par \par The past medical history, surgical history, family history, allergies, medications, 10+ point review of systems, family history and social history were reviewed and non contributory.

## 2020-12-21 NOTE — PHYSICAL EXAM
[de-identified] : Physical Exam:\par \par General: patient is well developed, well nourished, in no acute \par distress, alert and oriented x 3. \par \par Mood and affect: normal\par \par Respiratory: no respiratory distress noted\par \par Skin: no scars over spine, skin intact, no erythema, increased warmth\par \par Alignment:The spine is well compensated in the coronal and sagittal plane. There is no asymmetry on Christine Forward Bend Test.\par \par Gait: The patient is able to toe walk and heel walk without difficulty. The patient is able to tandem walk without difficulty.\par \par Palpation: no tenderness to palpation midline along spine or paraspinal region\par \par Range of motion: Cervical and Lumbar spine ROM is full\par \par Neurologic Exam:\par Motor: Manual Muscle testing in the upper and lower extremities is 5 out of 5 in all muscle groups. There is no evidence of muscular atrophy in the upper extremities. Sensory: Sensation to light touch is grossly intact in the upper and lower extremities\par \par Reflexes: DTR are present and symmetric throughout, negative humphrey bilaterally, negative inverted radial reflex bilaterally, no clonus, plantar responses are flexor\par \par Special tests: Spurlings sign absent. Lhermitte's sign is absent. Straight Leg Raise Negative, Cross Straight Leg Raise Negative, DEEP Test Negative\par \par Hip Exam: Full painless ROM of bilateral hips\par \par Vascular: Examination of the peripheral vascular system demonstrates no evidence of congestion or edema. no lymphedema bilateral lower extremities, pulses are present and symmetric in both lower extremities.\par  [de-identified] : CT cervical 12/2020 ([facility]): multilevel degenerative changes, most pronounced at C4/C5 and C5/C6 with severe central canal and moderate/severe bilateral foraminal stenosis; no fractures seen\par \par MRI cervical 11/2020 ([facility]): C4/C5 and C5/C6 disc osteophyte and posterior element hypertrophy resulting in severe central canal stenosis and cord compression, moderate/severe bilateral foraminal stenosis; cord signal change at these levels consistent with myelomalacia.

## 2020-12-24 ENCOUNTER — APPOINTMENT (OUTPATIENT)
Dept: ENDOCRINOLOGY | Facility: CLINIC | Age: 60
End: 2020-12-24

## 2020-12-28 ENCOUNTER — OUTPATIENT (OUTPATIENT)
Dept: OUTPATIENT SERVICES | Facility: HOSPITAL | Age: 60
LOS: 1 days | End: 2020-12-28
Payer: COMMERCIAL

## 2020-12-28 ENCOUNTER — APPOINTMENT (OUTPATIENT)
Dept: OTOLARYNGOLOGY | Facility: CLINIC | Age: 60
End: 2020-12-28
Payer: COMMERCIAL

## 2020-12-28 ENCOUNTER — RESULT REVIEW (OUTPATIENT)
Age: 60
End: 2020-12-28

## 2020-12-28 VITALS
OXYGEN SATURATION: 98 % | SYSTOLIC BLOOD PRESSURE: 159 MMHG | TEMPERATURE: 97.3 F | BODY MASS INDEX: 23.19 KG/M2 | HEART RATE: 77 BPM | WEIGHT: 162 LBS | DIASTOLIC BLOOD PRESSURE: 97 MMHG | HEIGHT: 70 IN

## 2020-12-28 PROCEDURE — 31575 DIAGNOSTIC LARYNGOSCOPY: CPT

## 2020-12-28 PROCEDURE — 71046 X-RAY EXAM CHEST 2 VIEWS: CPT

## 2020-12-28 PROCEDURE — 99203 OFFICE O/P NEW LOW 30 MIN: CPT | Mod: 25

## 2020-12-28 PROCEDURE — 71046 X-RAY EXAM CHEST 2 VIEWS: CPT | Mod: 26

## 2020-12-28 PROCEDURE — 99072 ADDL SUPL MATRL&STAF TM PHE: CPT

## 2020-12-28 NOTE — HISTORY OF PRESENT ILLNESS
[de-identified] : 59yo M, referred by Dr. Frey. Pt. is planned for ACDF C4-7. He denies previous neck surgeries, denies voice change. complains of stiffness and burning sensation in right greater than left hand, intermittently for years.

## 2020-12-28 NOTE — PROCEDURE
[Image(s) Captured] : image(s) captured and filed [Unable to Cooperate with Mirror] : patient unable to cooperate with mirror [None] : none [Flexible Endoscope] : examined with the flexible endoscope [de-identified] : Flexible fiberoptic laryngoscope advanced orally, tonsillar pillars and tonsils/tonsil bed visualized, oropharyngeal and hypopharyngeal walls and BOT, epiglottis all visualized, no masses, no fungating/ulcerating lesions evident; larynx visualized, true vocal cords abduct and adduct and meet in the midline no leuko/erythroplakic lesions . No pooling of secretions. [de-identified] : pre-op evaluation of RLN function

## 2020-12-28 NOTE — PHYSICAL EXAM
[Normal] : external appearance is normal [Laryngoscopy Performed] : laryngoscopy was performed, see procedure section for findings

## 2020-12-28 NOTE — ASSESSMENT
[FreeTextEntry1] : A 61yo male, referred by Dr. Frey for pre-ACDF evaluation. We thoroughly discussed our part of the operation, discussed risks and complications including bleeding, infection, Recurrent laryngeal nerve and other neurovascular injuries. \par plan:\par - OR ACDF (Dr. Frey 1/21/2021)\par - RTC sooner should any worrisome symptoms develop.\par - Pt verbalized understanding of above.\par

## 2020-12-28 NOTE — REVIEW OF SYSTEMS
[Patient Intake Form Reviewed] : Patient intake form was reviewed [As noted in HPI] : as noted in HPI [As Noted in HPI] : as noted in HPI [Negative] : Heme/Lymph

## 2021-01-04 ENCOUNTER — NON-APPOINTMENT (OUTPATIENT)
Age: 61
End: 2021-01-04

## 2021-01-04 ENCOUNTER — APPOINTMENT (OUTPATIENT)
Dept: HEART AND VASCULAR | Facility: CLINIC | Age: 61
End: 2021-01-04
Payer: COMMERCIAL

## 2021-01-04 ENCOUNTER — APPOINTMENT (OUTPATIENT)
Dept: HEART AND VASCULAR | Facility: CLINIC | Age: 61
End: 2021-01-04
Payer: MEDICARE

## 2021-01-04 VITALS
DIASTOLIC BLOOD PRESSURE: 82 MMHG | BODY MASS INDEX: 23.62 KG/M2 | TEMPERATURE: 97.4 F | WEIGHT: 165 LBS | SYSTOLIC BLOOD PRESSURE: 142 MMHG | HEIGHT: 70 IN | HEART RATE: 66 BPM

## 2021-01-04 DIAGNOSIS — Z01.810 ENCOUNTER FOR PREPROCEDURAL CARDIOVASCULAR EXAMINATION: ICD-10-CM

## 2021-01-04 PROCEDURE — 99214 OFFICE O/P EST MOD 30 MIN: CPT | Mod: 25

## 2021-01-04 PROCEDURE — 93000 ELECTROCARDIOGRAM COMPLETE: CPT | Mod: NC

## 2021-01-04 PROCEDURE — 93306 TTE W/DOPPLER COMPLETE: CPT

## 2021-01-04 RX ORDER — PITAVASTATIN CALCIUM 4.18 MG/1
4 TABLET, FILM COATED ORAL
Qty: 1 | Refills: 1 | Status: DISCONTINUED | COMMUNITY
Start: 2019-04-25 | End: 2021-01-04

## 2021-01-04 RX ORDER — MELOXICAM 15 MG/1
15 TABLET ORAL
Qty: 30 | Refills: 3 | Status: DISCONTINUED | COMMUNITY
Start: 2020-11-09 | End: 2021-01-04

## 2021-01-04 NOTE — HISTORY OF PRESENT ILLNESS
[FreeTextEntry1] : no cp,sob,claudication- has UE numbness and found to have C/S disease- he did not increase Livalo( he felt 4mg was too much)

## 2021-01-04 NOTE — DISCUSSION/SUMMARY
[FreeTextEntry1] : EKG:nsr,WNL\par echo;normal LVEF,mild MR\par Reviewed RECENT LABS(last week) - WYD=701FH%( TARGET GIVEN CAD IS < 70MG)- WOULD INCREASE Livalo  for lipids/CAD;asa for CAD\par His cardiac status is stable enough to permit cervical spine surgery

## 2021-01-04 NOTE — PHYSICAL EXAM
[General Appearance - Well Developed] : well developed [Normal Appearance] : normal appearance [Well Groomed] : well groomed [General Appearance - Well Nourished] : well nourished [Normal Conjunctiva] : the conjunctiva exhibited no abnormalities [Normal Oral Mucosa] : normal oral mucosa [Normal Jugular Venous A Waves Present] : normal jugular venous A waves present [Normal Jugular Venous V Waves Present] : normal jugular venous V waves present [No Jugular Venous Odonnell A Waves] : no jugular venous odonnell A waves [] : no respiratory distress [Respiration, Rhythm And Depth] : normal respiratory rhythm and effort [Exaggerated Use Of Accessory Muscles For Inspiration] : no accessory muscle use [Auscultation Breath Sounds / Voice Sounds] : lungs were clear to auscultation bilaterally [Heart Rate And Rhythm] : heart rate and rhythm were normal [Heart Sounds] : normal S1 and S2 [Murmurs] : no murmurs present [Edema] : no peripheral edema present [Bowel Sounds] : normal bowel sounds [Abdomen Soft] : soft [Abdomen Tenderness] : non-tender [Abnormal Walk] : normal gait [Nail Clubbing] : no clubbing of the fingernails [Skin Color & Pigmentation] : normal skin color and pigmentation [Oriented To Time, Place, And Person] : oriented to person, place, and time [FreeTextEntry1] : decreased pulse RLE

## 2021-01-04 NOTE — REVIEW OF SYSTEMS
[Eyeglasses] : currently wearing eyeglasses [Nocturia] : nocturia [Ankle Problem] : ankle problems [Numbness (Hypesthesia)] : numbness [Negative] : Heme/Lymph [Fever] : no fever [Headache] : no headache [Recent Weight Gain (___ Lbs)] : no recent weight gain [Chills] : no chills [Feeling Fatigued] : not feeling fatigued [Recent Weight Loss (___ Lbs)] : no recent weight loss [Shortness Of Breath] : no shortness of breath [Dyspnea on exertion] : not dyspnea during exertion [Chest  Pressure] : no chest pressure [Chest Pain] : no chest pain [Lower Ext Edema] : no extremity edema [Leg Claudication] : no intermittent leg claudication [Palpitations] : no palpitations [Cough] : no cough [Abdominal Pain] : no abdominal pain [Nausea] : no nausea [Vomiting] : no vomiting [Hematuria] : no hematuria [Joint Pain] : no joint pain [Limb Weakness (Paresis)] : no limb weakness [Dizziness] : no dizziness [Tremor] : no tremor was seen [Convulsions] : no convulsions [Tingling (Paresthesia)] : no tingling [Confusion] : no confusion was observed [Memory Lapses Or Loss] : no memory lapses or loss [Anxiety] : no anxiety [Under Stress] : not under stress

## 2021-01-05 ENCOUNTER — APPOINTMENT (OUTPATIENT)
Dept: DISASTER EMERGENCY | Facility: CLINIC | Age: 61
End: 2021-01-05

## 2021-01-05 DIAGNOSIS — Z01.818 ENCOUNTER FOR OTHER PREPROCEDURAL EXAMINATION: ICD-10-CM

## 2021-01-07 LAB — SARS-COV-2 N GENE NPH QL NAA+PROBE: NOT DETECTED

## 2021-01-11 ENCOUNTER — APPOINTMENT (OUTPATIENT)
Dept: OTOLARYNGOLOGY | Facility: CLINIC | Age: 61
End: 2021-01-11

## 2021-01-13 ENCOUNTER — APPOINTMENT (OUTPATIENT)
Dept: ORTHOPEDIC SURGERY | Facility: CLINIC | Age: 61
End: 2021-01-13
Payer: COMMERCIAL

## 2021-01-13 DIAGNOSIS — R20.2 ANESTHESIA OF SKIN: ICD-10-CM

## 2021-01-13 DIAGNOSIS — R20.0 ANESTHESIA OF SKIN: ICD-10-CM

## 2021-01-13 DIAGNOSIS — M47.12 OTHER SPONDYLOSIS WITH MYELOPATHY, CERVICAL REGION: ICD-10-CM

## 2021-01-13 DIAGNOSIS — M47.22 OTHER SPONDYLOSIS WITH MYELOPATHY, CERVICAL REGION: ICD-10-CM

## 2021-01-13 PROCEDURE — 99214 OFFICE O/P EST MOD 30 MIN: CPT | Mod: 95

## 2021-01-18 ENCOUNTER — APPOINTMENT (OUTPATIENT)
Dept: DISASTER EMERGENCY | Facility: CLINIC | Age: 61
End: 2021-01-18

## 2021-01-20 ENCOUNTER — TRANSCRIPTION ENCOUNTER (OUTPATIENT)
Age: 61
End: 2021-01-20

## 2021-01-20 VITALS
SYSTOLIC BLOOD PRESSURE: 138 MMHG | TEMPERATURE: 97 F | DIASTOLIC BLOOD PRESSURE: 80 MMHG | WEIGHT: 166.89 LBS | OXYGEN SATURATION: 97 % | RESPIRATION RATE: 16 BRPM | HEART RATE: 67 BPM | HEIGHT: 68 IN

## 2021-01-20 LAB — SARS-COV-2 N GENE NPH QL NAA+PROBE: NOT DETECTED

## 2021-01-20 RX ORDER — POVIDONE-IODINE 5 %
1 AEROSOL (ML) TOPICAL ONCE
Refills: 0 | Status: COMPLETED | OUTPATIENT
Start: 2021-01-21 | End: 2021-01-21

## 2021-01-20 RX ORDER — INFLUENZA VIRUS VACCINE 15; 15; 15; 15 UG/.5ML; UG/.5ML; UG/.5ML; UG/.5ML
0.5 SUSPENSION INTRAMUSCULAR ONCE
Refills: 0 | Status: DISCONTINUED | OUTPATIENT
Start: 2021-01-21 | End: 2021-01-22

## 2021-01-20 NOTE — H&P ADULT - HISTORY OF PRESENT ILLNESS
61yo f c/o neck pain x   Presents today for elective ACDF C4-C7. 61yo f c/o neck pain x several years. Denies known injury. Reports worse in the last 3 months. Endorses stiffness with ROM, pain localized to the neck, notes burning R hand worse than left. Patient reports failure of conservative management including activity modification, oral analgesics.   Presents today for elective ACDF C4-C7, C5/C6 corpectomy with Dr. Frey.   Denies recent illness, denies h/o blood clots, use of anticoagulants.

## 2021-01-20 NOTE — H&P ADULT - NSICDXPASTMEDICALHX_GEN_ALL_CORE_FT
PAST MEDICAL HISTORY:  CAD (coronary artery disease)     Cervical radiculopathy     Dyslipidemia     Mitral regurgitation     PAD (peripheral artery disease)     PVD (peripheral vascular disease)

## 2021-01-20 NOTE — PRE-OP CHECKLIST - SELECT TESTS ORDERED
PFT/CBC/CMP/PT/PTT/INR/Urinalysis/EKG/CXR PFT, 11/18 covid negative/CBC/CMP/PT/PTT/INR/Urinalysis/EKG/CXR

## 2021-01-20 NOTE — H&P ADULT - NSICDXPASTSURGICALHX_GEN_ALL_CORE_FT
PAST SURGICAL HISTORY:  Elective surgery repair of calcaneous fracture, right    H/O left inguinal hernia repair     Tibia fracture s/p repair, right

## 2021-01-20 NOTE — H&P ADULT - PROBLEM SELECTOR PLAN 1
Admit to Orthopaedic Service.  Presents today for elective ACDF C4-C7.  Pt medically stable and cleared for procedure today by cleared by Dr. Wong (cardio), Dr. Birmingham (pulm), and Dr. Sterling.

## 2021-01-20 NOTE — H&P ADULT - NSHPLABSRESULTS_GEN_ALL_CORE
preop cbc/bmp/coags/ua wnl per medical clearance   Cr 0.87  Preop EKG wnl per clearance NSR  Echo- nl LVEF mild EF per medical clearance  Spirometry wnl per medical clearance   Preop chest x-ray wnl per clearance

## 2021-01-20 NOTE — H&P ADULT - NSHPPHYSICALEXAM_GEN_ALL_CORE
GENERAL:  PE:  Decreased ROM secondary to pain. Rest of PE per medical clearance. Gen: 61 y/o male, well nourished, well developed, NAD  MSK: Decreased ROM secondary to pain at the cervical spine   sensation intact to light touch bilateral upper extremities   strength/biceps/triceps no deficits     Rest of PE per MD clearance

## 2021-01-21 ENCOUNTER — APPOINTMENT (OUTPATIENT)
Dept: ORTHOPEDIC SURGERY | Facility: HOSPITAL | Age: 61
End: 2021-01-21

## 2021-01-21 ENCOUNTER — INPATIENT (INPATIENT)
Facility: HOSPITAL | Age: 61
LOS: 0 days | Discharge: HOME | DRG: 472 | End: 2021-01-22
Attending: ORTHOPAEDIC SURGERY | Admitting: ORTHOPAEDIC SURGERY
Payer: COMMERCIAL

## 2021-01-21 DIAGNOSIS — G95.20 UNSPECIFIED CORD COMPRESSION: ICD-10-CM

## 2021-01-21 DIAGNOSIS — Z41.9 ENCOUNTER FOR PROCEDURE FOR PURPOSES OTHER THAN REMEDYING HEALTH STATE, UNSPECIFIED: Chronic | ICD-10-CM

## 2021-01-21 DIAGNOSIS — I25.10 ATHEROSCLEROTIC HEART DISEASE OF NATIVE CORONARY ARTERY WITHOUT ANGINA PECTORIS: ICD-10-CM

## 2021-01-21 DIAGNOSIS — M54.12 RADICULOPATHY, CERVICAL REGION: ICD-10-CM

## 2021-01-21 DIAGNOSIS — S82.209A UNSPECIFIED FRACTURE OF SHAFT OF UNSPECIFIED TIBIA, INITIAL ENCOUNTER FOR CLOSED FRACTURE: Chronic | ICD-10-CM

## 2021-01-21 DIAGNOSIS — Z98.890 OTHER SPECIFIED POSTPROCEDURAL STATES: Chronic | ICD-10-CM

## 2021-01-21 DIAGNOSIS — I73.9 PERIPHERAL VASCULAR DISEASE, UNSPECIFIED: ICD-10-CM

## 2021-01-21 DIAGNOSIS — I34.0 NONRHEUMATIC MITRAL (VALVE) INSUFFICIENCY: ICD-10-CM

## 2021-01-21 DIAGNOSIS — E78.5 HYPERLIPIDEMIA, UNSPECIFIED: ICD-10-CM

## 2021-01-21 LAB
BLD GP AB SCN SERPL QL: NEGATIVE — SIGNIFICANT CHANGE UP
RH IG SCN BLD-IMP: POSITIVE — SIGNIFICANT CHANGE UP

## 2021-01-21 PROCEDURE — 22845 INSERT SPINE FIXATION DEVICE: CPT | Mod: 59

## 2021-01-21 PROCEDURE — 72125 CT NECK SPINE W/O DYE: CPT | Mod: 26

## 2021-01-21 PROCEDURE — 22554 ARTHRD ANT NTRBD MIN DSC CRV: CPT | Mod: 62

## 2021-01-21 PROCEDURE — 22585 ARTHRD ANT NTRBD MIN DSC EA: CPT | Mod: 62

## 2021-01-21 PROCEDURE — 22854 INSJ BIOMECHANICAL DEVICE: CPT | Mod: 82

## 2021-01-21 PROCEDURE — 63081 REMOVE VERT BODY DCMPRN CRVL: CPT | Mod: 62

## 2021-01-21 PROCEDURE — 22845 INSERT SPINE FIXATION DEVICE: CPT | Mod: 82,59

## 2021-01-21 PROCEDURE — 22854 INSJ BIOMECHANICAL DEVICE: CPT

## 2021-01-21 PROCEDURE — 22554 ARTHRD ANT NTRBD MIN DSC CRV: CPT | Mod: 80

## 2021-01-21 PROCEDURE — 22585 ARTHRD ANT NTRBD MIN DSC EA: CPT | Mod: 80

## 2021-01-21 RX ORDER — CEFAZOLIN SODIUM 1 G
2000 VIAL (EA) INJECTION EVERY 8 HOURS
Refills: 0 | Status: COMPLETED | OUTPATIENT
Start: 2021-01-21 | End: 2021-01-22

## 2021-01-21 RX ORDER — PITAVASTATIN CALCIUM 1.04 MG/1
1 TABLET, FILM COATED ORAL
Qty: 0 | Refills: 0 | DISCHARGE

## 2021-01-21 RX ORDER — FAMOTIDINE 10 MG/ML
20 INJECTION INTRAVENOUS EVERY 12 HOURS
Refills: 0 | Status: DISCONTINUED | OUTPATIENT
Start: 2021-01-21 | End: 2021-01-22

## 2021-01-21 RX ORDER — SODIUM CHLORIDE 9 MG/ML
1000 INJECTION, SOLUTION INTRAVENOUS
Refills: 0 | Status: DISCONTINUED | OUTPATIENT
Start: 2021-01-21 | End: 2021-01-22

## 2021-01-21 RX ORDER — METOCLOPRAMIDE HCL 10 MG
10 TABLET ORAL EVERY 8 HOURS
Refills: 0 | Status: DISCONTINUED | OUTPATIENT
Start: 2021-01-21 | End: 2021-01-22

## 2021-01-21 RX ORDER — ACETAMINOPHEN 500 MG
1000 TABLET ORAL EVERY 8 HOURS
Refills: 0 | Status: DISCONTINUED | OUTPATIENT
Start: 2021-01-21 | End: 2021-01-22

## 2021-01-21 RX ORDER — GABAPENTIN 400 MG/1
300 CAPSULE ORAL ONCE
Refills: 0 | Status: COMPLETED | OUTPATIENT
Start: 2021-01-21 | End: 2021-01-21

## 2021-01-21 RX ORDER — SENNA PLUS 8.6 MG/1
2 TABLET ORAL AT BEDTIME
Refills: 0 | Status: DISCONTINUED | OUTPATIENT
Start: 2021-01-21 | End: 2021-01-22

## 2021-01-21 RX ORDER — ONDANSETRON 8 MG/1
4 TABLET, FILM COATED ORAL EVERY 6 HOURS
Refills: 0 | Status: DISCONTINUED | OUTPATIENT
Start: 2021-01-21 | End: 2021-01-22

## 2021-01-21 RX ORDER — DEXAMETHASONE 0.5 MG/5ML
6 ELIXIR ORAL EVERY 6 HOURS
Refills: 0 | Status: DISCONTINUED | OUTPATIENT
Start: 2021-01-21 | End: 2021-01-22

## 2021-01-21 RX ORDER — CHLORHEXIDINE GLUCONATE 213 G/1000ML
1 SOLUTION TOPICAL ONCE
Refills: 0 | Status: COMPLETED | OUTPATIENT
Start: 2021-01-21 | End: 2021-01-21

## 2021-01-21 RX ORDER — SODIUM CHLORIDE 9 MG/ML
500 INJECTION INTRAMUSCULAR; INTRAVENOUS; SUBCUTANEOUS ONCE
Refills: 0 | Status: COMPLETED | OUTPATIENT
Start: 2021-01-21 | End: 2021-01-21

## 2021-01-21 RX ORDER — OXYCODONE HYDROCHLORIDE 5 MG/1
5 TABLET ORAL EVERY 4 HOURS
Refills: 0 | Status: DISCONTINUED | OUTPATIENT
Start: 2021-01-21 | End: 2021-01-22

## 2021-01-21 RX ORDER — HYDROMORPHONE HYDROCHLORIDE 2 MG/ML
0.5 INJECTION INTRAMUSCULAR; INTRAVENOUS; SUBCUTANEOUS
Refills: 0 | Status: DISCONTINUED | OUTPATIENT
Start: 2021-01-21 | End: 2021-01-22

## 2021-01-21 RX ORDER — ACETAMINOPHEN 500 MG
1000 TABLET ORAL ONCE
Refills: 0 | Status: COMPLETED | OUTPATIENT
Start: 2021-01-21 | End: 2021-01-21

## 2021-01-21 RX ORDER — APREPITANT 80 MG/1
40 CAPSULE ORAL ONCE
Refills: 0 | Status: COMPLETED | OUTPATIENT
Start: 2021-01-21 | End: 2021-01-21

## 2021-01-21 RX ORDER — POLYETHYLENE GLYCOL 3350 17 G/17G
17 POWDER, FOR SOLUTION ORAL DAILY
Refills: 0 | Status: DISCONTINUED | OUTPATIENT
Start: 2021-01-21 | End: 2021-01-22

## 2021-01-21 RX ORDER — METHOCARBAMOL 500 MG/1
500 TABLET, FILM COATED ORAL EVERY 8 HOURS
Refills: 0 | Status: DISCONTINUED | OUTPATIENT
Start: 2021-01-21 | End: 2021-01-22

## 2021-01-21 RX ADMIN — Medication 6 MILLIGRAM(S): at 21:56

## 2021-01-21 RX ADMIN — Medication 6 MILLIGRAM(S): at 16:48

## 2021-01-21 RX ADMIN — SODIUM CHLORIDE 1000 MILLILITER(S): 9 INJECTION INTRAMUSCULAR; INTRAVENOUS; SUBCUTANEOUS at 13:38

## 2021-01-21 RX ADMIN — CHLORHEXIDINE GLUCONATE 1 APPLICATION(S): 213 SOLUTION TOPICAL at 07:14

## 2021-01-21 RX ADMIN — METHOCARBAMOL 500 MILLIGRAM(S): 500 TABLET, FILM COATED ORAL at 21:56

## 2021-01-21 RX ADMIN — Medication 1 APPLICATION(S): at 07:14

## 2021-01-21 RX ADMIN — APREPITANT 40 MILLIGRAM(S): 80 CAPSULE ORAL at 07:13

## 2021-01-21 RX ADMIN — SODIUM CHLORIDE 100 MILLILITER(S): 9 INJECTION, SOLUTION INTRAVENOUS at 13:34

## 2021-01-21 RX ADMIN — GABAPENTIN 300 MILLIGRAM(S): 400 CAPSULE ORAL at 07:13

## 2021-01-21 RX ADMIN — Medication 100 MILLIGRAM(S): at 17:09

## 2021-01-21 RX ADMIN — METHOCARBAMOL 500 MILLIGRAM(S): 500 TABLET, FILM COATED ORAL at 18:59

## 2021-01-21 RX ADMIN — ONDANSETRON 4 MILLIGRAM(S): 8 TABLET, FILM COATED ORAL at 19:01

## 2021-01-21 RX ADMIN — Medication 50 MILLIGRAM(S): at 18:48

## 2021-01-21 RX ADMIN — Medication 1000 MILLIGRAM(S): at 15:19

## 2021-01-21 RX ADMIN — Medication 1000 MILLIGRAM(S): at 07:13

## 2021-01-21 NOTE — PHYSICAL THERAPY INITIAL EVALUATION ADULT - IMPAIRMENTS FOUND, PT EVAL
aerobic capacity/endurance/gait, locomotion, and balance/gross motor/joint integrity and mobility/muscle strength/posture

## 2021-01-21 NOTE — PHYSICAL THERAPY INITIAL EVALUATION ADULT - GENERAL OBSERVATIONS, REHAB EVAL
pt received/returned semi-supine in bed +heplock, +tele, +flynn, +cervical collar, +B/L SCDs, in NAD

## 2021-01-21 NOTE — BRIEF OPERATIVE NOTE - NSICDXBRIEFPROCEDURE_GEN_ALL_CORE_FT
PROCEDURES:  Other cervical fusion by anterior approach 21-Jan-2021 13:17:03 C4-C6 Lexis Merchant  Cervical corpectomy 21-Jan-2021 13:16:43 C5, partial C6 Lexis Merchant

## 2021-01-21 NOTE — PHYSICAL THERAPY INITIAL EVALUATION ADULT - GAIT DEVIATIONS NOTED, PT EVAL
decreased bk/increased time in double stance/decreased step length/decreased weight-shifting ability

## 2021-01-21 NOTE — PHYSICAL THERAPY INITIAL EVALUATION ADULT - FUNCTIONAL LIMITATIONS, PT EVAL
self-care/home management/community/leisure
no renal colic/no flank pain R/no urine discoloration/no flank pain L/no hematuria

## 2021-01-21 NOTE — PROGRESS NOTE ADULT - SUBJECTIVE AND OBJECTIVE BOX
rthopedics Post Op Check    Procedure: ACDF C4-C6, partial corpectomy C6  Surgeon: JOLYNN Pantoja. stable, laying in bed comfortably.  Denies any SOB/CP/nausea/vomiting/ numbness/tingling in b/l ues.     Vital Signs Last 24 Hrs  T(C): --  T(F): --  HR: 74 (21 Jan 2021 13:27) (74 - 78)  BP: 89/516 (21 Jan 2021 13:27) (88/50 - 93/52)  BP(mean): 65 (21 Jan 2021 13:12) (63 - 67)  RR: 16 (21 Jan 2021 13:27) (11 - 16)  SpO2: 99% (21 Jan 2021 13:27) (99% - 99%)      Dressing C/D/I prineo with 1 drain    Pulses: Brachial/Radial 2+ b/l ues  SLT: intact M/U/R  Motor: /Finger Int/Wrist flexion/ext/Biceps/triceps/Delts 5/5 b/l ues        A/P: 60yoMale POD#0 s/p ACDF C4-C6, partial corpectomy C6  - Stable  - Pain Control  - DVT ppx: scds   - Post op abx: ancef  - PT, WBS: wbat b/l ues  - F/U AM Labs  - 500cc bolus given by Jolynn WONG 2/2 to hypotension.

## 2021-01-21 NOTE — PHYSICAL THERAPY INITIAL EVALUATION ADULT - ADDITIONAL COMMENTS
pt states that he lives w/ his wife in a house w/ 1 flight of stairs. Denies use of DME for ambulation prior to this admission. States that he was independent in all ADLs prior to this admission

## 2021-01-22 ENCOUNTER — TRANSCRIPTION ENCOUNTER (OUTPATIENT)
Age: 61
End: 2021-01-22

## 2021-01-22 VITALS
RESPIRATION RATE: 17 BRPM | DIASTOLIC BLOOD PRESSURE: 74 MMHG | OXYGEN SATURATION: 96 % | HEART RATE: 68 BPM | SYSTOLIC BLOOD PRESSURE: 133 MMHG | TEMPERATURE: 98 F

## 2021-01-22 PROBLEM — M47.12 CERVICAL SPONDYLOSIS WITH MYELOPATHY AND RADICULOPATHY: Status: ACTIVE | Noted: 2020-11-06

## 2021-01-22 PROBLEM — R20.0 NUMBNESS AND TINGLING IN BOTH HANDS: Status: ACTIVE | Noted: 2020-10-28

## 2021-01-22 PROCEDURE — 86901 BLOOD TYPING SEROLOGIC RH(D): CPT

## 2021-01-22 PROCEDURE — 82330 ASSAY OF CALCIUM: CPT

## 2021-01-22 PROCEDURE — 72040 X-RAY EXAM NECK SPINE 2-3 VW: CPT | Mod: 26

## 2021-01-22 PROCEDURE — C1889: CPT

## 2021-01-22 PROCEDURE — 86850 RBC ANTIBODY SCREEN: CPT

## 2021-01-22 PROCEDURE — 84132 ASSAY OF SERUM POTASSIUM: CPT

## 2021-01-22 PROCEDURE — 36415 COLL VENOUS BLD VENIPUNCTURE: CPT

## 2021-01-22 PROCEDURE — 76000 FLUOROSCOPY <1 HR PHYS/QHP: CPT

## 2021-01-22 PROCEDURE — 82962 GLUCOSE BLOOD TEST: CPT

## 2021-01-22 PROCEDURE — 80048 BASIC METABOLIC PNL TOTAL CA: CPT

## 2021-01-22 PROCEDURE — 97161 PT EVAL LOW COMPLEX 20 MIN: CPT

## 2021-01-22 PROCEDURE — 97530 THERAPEUTIC ACTIVITIES: CPT

## 2021-01-22 PROCEDURE — C1713: CPT

## 2021-01-22 PROCEDURE — 84295 ASSAY OF SERUM SODIUM: CPT

## 2021-01-22 PROCEDURE — 85018 HEMOGLOBIN: CPT

## 2021-01-22 PROCEDURE — 99255 IP/OBS CONSLTJ NEW/EST HI 80: CPT

## 2021-01-22 PROCEDURE — 72040 X-RAY EXAM NECK SPINE 2-3 VW: CPT

## 2021-01-22 PROCEDURE — 86900 BLOOD TYPING SEROLOGIC ABO: CPT

## 2021-01-22 RX ORDER — ACETAMINOPHEN 500 MG
2 TABLET ORAL
Qty: 84 | Refills: 0
Start: 2021-01-22 | End: 2021-02-04

## 2021-01-22 RX ORDER — METHOCARBAMOL 500 MG/1
1 TABLET, FILM COATED ORAL
Qty: 21 | Refills: 0
Start: 2021-01-22 | End: 2021-01-28

## 2021-01-22 RX ORDER — SENNA PLUS 8.6 MG/1
2 TABLET ORAL
Qty: 0 | Refills: 0 | DISCHARGE
Start: 2021-01-22

## 2021-01-22 RX ORDER — OXYCODONE HYDROCHLORIDE 5 MG/1
1 TABLET ORAL
Qty: 20 | Refills: 0
Start: 2021-01-22 | End: 2021-01-26

## 2021-01-22 RX ORDER — ASPIRIN/CALCIUM CARB/MAGNESIUM 324 MG
1 TABLET ORAL
Qty: 0 | Refills: 0 | DISCHARGE

## 2021-01-22 RX ADMIN — POLYETHYLENE GLYCOL 3350 17 GRAM(S): 17 POWDER, FOR SOLUTION ORAL at 11:44

## 2021-01-22 RX ADMIN — METHOCARBAMOL 500 MILLIGRAM(S): 500 TABLET, FILM COATED ORAL at 13:26

## 2021-01-22 RX ADMIN — Medication 100 MILLIGRAM(S): at 00:16

## 2021-01-22 RX ADMIN — Medication 50 MILLIGRAM(S): at 06:15

## 2021-01-22 RX ADMIN — Medication 1000 MILLIGRAM(S): at 13:26

## 2021-01-22 RX ADMIN — Medication 50 MILLIGRAM(S): at 13:26

## 2021-01-22 RX ADMIN — Medication 1000 MILLIGRAM(S): at 00:16

## 2021-01-22 RX ADMIN — Medication 6 MILLIGRAM(S): at 11:44

## 2021-01-22 RX ADMIN — Medication 6 MILLIGRAM(S): at 06:15

## 2021-01-22 RX ADMIN — Medication 50 MILLIGRAM(S): at 00:16

## 2021-01-22 RX ADMIN — METHOCARBAMOL 500 MILLIGRAM(S): 500 TABLET, FILM COATED ORAL at 06:15

## 2021-01-22 RX ADMIN — Medication 6 MILLIGRAM(S): at 17:10

## 2021-01-22 RX ADMIN — Medication 1000 MILLIGRAM(S): at 06:19

## 2021-01-22 NOTE — DISCHARGE NOTE PROVIDER - NSDCCPCAREPLAN_GEN_ALL_CORE_FT
PRINCIPAL DISCHARGE DIAGNOSIS  Diagnosis: Cervical radiculopathy  Assessment and Plan of Treatment: improvement s/p ACDF C4-6, C5 corpectomy, partial C6 corpectomy

## 2021-01-22 NOTE — PROGRESS NOTE ADULT - NUTRITIONAL ASSESSMENT
60 y.o M PMH HLD now s/p ACDF C4-C6, partial corpectomy C6 POD#1.    #pod1  pain control  IS  bowel regimen  PT    #HLD  continue home med    #hyperglycemia  likely 2/2 steroids  monitor, use SSI if fs>150    dvt ppx - per primary team  diet - regular  dispo - pending pt

## 2021-01-22 NOTE — PROGRESS NOTE ADULT - SUBJECTIVE AND OBJECTIVE BOX
Patient is a 60y old  Male who presents with a chief complaint of neck pain (22 Jan 2021 09:24)      INTERVAL HPI/OVERNIGHT EVENTS:  none    Subjective:  patient feels well today   reports pain controlled on current regimen  no other complaints      Review of Systems: 12 point review of systems otherwise negative    MEDICATIONS  (STANDING):  acetaminophen   Tablet .. 1000 milliGRAM(s) Oral every 8 hours  dexAMETHasone  Injectable 6 milliGRAM(s) IV Push every 6 hours  influenza   Vaccine 0.5 milliLiter(s) IntraMuscular once  methocarbamol 500 milliGRAM(s) Oral every 8 hours  ondansetron   Disintegrating Tablet 4 milliGRAM(s) Oral every 6 hours  polyethylene glycol 3350 17 Gram(s) Oral daily  pregabalin 50 milliGRAM(s) Oral three times a day  senna 2 Tablet(s) Oral at bedtime  Streptomycin 1 gram 1 Application(s) 1 Application(s) Topical every 1 hour    MEDICATIONS  (PRN):  bisacodyl 5 milliGRAM(s) Oral every 12 hours PRN Constipation  famotidine    Tablet 20 milliGRAM(s) Oral every 12 hours PRN Dyspepsia  HYDROmorphone  Injectable 0.5 milliGRAM(s) IV Push every 15 minutes PRN Severe Pain (7 - 10)  metoclopramide Injectable 10 milliGRAM(s) IV Push every 8 hours PRN nausea  oxyCODONE    IR 5 milliGRAM(s) Oral every 4 hours PRN Severe Pain (7 - 10)      Allergies    amoxicillin (Rash)  Crestor (Other)  Lipitor (Other)  penicillin (Unknown)    Intolerances          Vital Signs Last 24 Hrs  T(C): 36.5 (22 Jan 2021 13:35), Max: 36.7 (22 Jan 2021 06:06)  T(F): 97.7 (22 Jan 2021 13:35), Max: 98.1 (22 Jan 2021 06:06)  HR: 68 (22 Jan 2021 13:35) (62 - 75)  BP: 123/72 (22 Jan 2021 13:35) (92/51 - 123/72)  BP(mean): 71 (21 Jan 2021 21:47) (71 - 89)  RR: 17 (22 Jan 2021 13:35) (5 - 20)  SpO2: 97% (22 Jan 2021 13:35) (94% - 100%)  CAPILLARY BLOOD GLUCOSE          01-21 @ 07:01 - 01-22 @ 07:00  --------------------------------------------------------  IN: 2670 mL / OUT: 1660 mL / NET: 1010 mL    01-22 @ 07:01 - 01-22 @ 14:41  --------------------------------------------------------  IN: 0 mL / OUT: 610 mL / NET: -610 mL        Physical Exam:    Daily     Daily   General:  Well appearing, NAD, not cachetic  HEENT:  Nonicteric, PERRLA; c collar in tact   CV:  RRR, no murmur, no JVD  Lungs:  CTA B/L, no wheezes, rales, rhonchi  Abdomen:  Soft, non-tender, no distended, positive BS, no hepatosplenomegaly  Extremities:  2+ pulses, no c/c, no edema  Skin:  Warm and dry, no rashes  :  No flynn  Neuro:  AAOx3, non-focal, CN II-XII grossly intact  No Restraints    LABS:                        12.8   9.15  )-----------( 176      ( 21 Jan 2021 14:29 )             39.0     01-21    140  |  106  |  14  ----------------------------<  139<H>  3.8   |  22  |  0.82    Ca    7.9<L>      21 Jan 2021 14:18

## 2021-01-22 NOTE — DISCHARGE NOTE PROVIDER - NSDCFUADDINST_GEN_ALL_CORE_FT
No strenuous activity (bending/twisting), heavy lifting, driving or returning to work until cleared by MD.  May shower POD#1. Let soapy water fall over incision and pat dry. Do not need to cover. Dressing will be removed in the office.  Try to have regular bowel movements, take stool softener or laxative if necessary.  May apply ice to affected areas to decrease swelling.  Call to schedule an appt with Dr. Frey for follow up.  Contact your doctor if you experience: fever greater than 101.5, chills, chest pain, difficulty breathing, redness or excessive drainage around the incision, other concerns.  Follow up with your primary care provider.

## 2021-01-22 NOTE — DISCHARGE NOTE PROVIDER - NSDCMRMEDTOKEN_GEN_ALL_CORE_FT
aspirin 81 mg oral delayed release tablet: 1 tab(s) orally once a day  Livalo 2 mg oral tablet: 1 tab(s) orally once a day   acetaminophen 500 mg oral tablet: 2 tab(s) orally every 8 hours  Livalo 2 mg oral tablet: 1 tab(s) orally once a day  methocarbamol 500 mg oral tablet: 1 tab(s) orally every 8 hours MDD:3  oxyCODONE 5 mg oral tablet: 1 tab(s) orally every 6 hours, As Needed -Severe Pain (7 - 10) MDD:4  pregabalin 50 mg oral capsule: 1 cap(s) orally 3 times a day MDD:3  senna oral tablet: 2 tab(s) orally once a day (at bedtime)

## 2021-01-22 NOTE — HISTORY OF PRESENT ILLNESS
[de-identified] : 1/13/21: Symptoms unchanged with respect to neck pain and burning of bilateral hands.  Visit to discuss surgical options.

## 2021-01-22 NOTE — DISCUSSION/SUMMARY
[de-identified] : Had long discussion with patient, wife, son.  We reviewed his imaging again at length including XR, MRI and CT.  We discussed his severe cord compression from C4-C6 which is combination of anterior compression, disc osteophytes, retrovertebral osteophytes, congential narrow canal, posterior ligagmentous hypertrophy.  We discussed that majority of compression is anterior and after reviewing images with our group in conference, while both anterior and posterior approaches are options for the patient, we favor anterior option with C5 corpectomy.  We discussed at length risks/benefits of both approaches.  We discussed that with anterior approach, there is possibility for future need for surgery in short term (if unable to adequately decopress this zone) or in long term (with subsequent breakdown of the C3/4 and C6/7 discs in context of congenital stenosis).  Patient, wife, son understand and they wish to proceed with anterior approach.  Surgery will be with Dr. Sterling for approach and co-surgeon with Dr. Arvizu of neurosurgery.  All questions answered at length.

## 2021-01-22 NOTE — PROGRESS NOTE ADULT - SUBJECTIVE AND OBJECTIVE BOX
Orthopedics    Procedure: ACDF C4-C6, partial corpectomy C6  Surgeon: Tk  Procedure Date: 1/21/21    Patient comfortable in bed. Pain controlled.  Denies any SOB/CP/nausea/vomiting/ numbness/tingling in b/l ues.     Vital Signs Last 24 Hrs  T(C): 36.7 (22 Jan 2021 06:06), Max: 36.7 (22 Jan 2021 06:06)  T(F): 98.1 (22 Jan 2021 06:06), Max: 98.1 (22 Jan 2021 06:06)  HR: 67 (22 Jan 2021 06:06) (65 - 78)  BP: 99/65 (22 Jan 2021 06:06) (88/50 - 122/74)  BP(mean): 71 (21 Jan 2021 21:47) (63 - 89)  RR: 18 (22 Jan 2021 06:06) (5 - 20)  SpO2: 96% (22 Jan 2021 06:06) (94% - 100%)    Collar intact  Dressing C/D/I prineo with 1 drain  Pulses: 2+ rad  Sensation C5-T1 intact bilaterally   Motor: /Finger Int/Wrist flexion/ext/Biceps/triceps/Delts 5/5 bilaterally       A/P: 60yoMale s/p ACDF C4-C6, partial corpectomy C6  - Stable  - Pain Control  - DVT ppx: scds   - Post op abx: ancef  - PT, WBS: wbat b/l ues  - F/U AM Labs

## 2021-01-22 NOTE — DISCHARGE NOTE PROVIDER - HOSPITAL COURSE
Admitted  Surgery ACDF C4-6, C5 corpectomy, partial C6 corpectomy  Wanda-op Antibiotics  Pain control  DVT prophylaxis  OOB/Physical Therapy Admitted 1-  Surgery ACDF C4-6, C5 corpectomy, partial C6 corpectomy  Wanda-op Antibiotics  Pain control  DVT prophylaxis  OOB/Physical Therapy

## 2021-01-22 NOTE — DISCHARGE NOTE NURSING/CASE MANAGEMENT/SOCIAL WORK - PATIENT PORTAL LINK FT
You can access the FollowMyHealth Patient Portal offered by [facility] by registering at the following website: [email or web address]. By joining [facility]’s FollowMyHealth portal, you will also be able to view your health information using other applications (apps) compatible with our system.

## 2021-01-22 NOTE — REASON FOR VISIT
[Home] : at home, [unfilled] , at the time of the visit. [Medical Office: ([facility])___] : at the medical office located in  [Spouse] : spouse [Family Member] : family member

## 2021-01-22 NOTE — DISCHARGE NOTE PROVIDER - NSDCCPTREATMENT_GEN_ALL_CORE_FT
PRINCIPAL PROCEDURE  Procedure: Other cervical fusion by anterior approach  Findings and Treatment: C4-C6      SECONDARY PROCEDURE  Procedure: Cervical corpectomy  Findings and Treatment: C5, partial C6

## 2021-01-22 NOTE — OCCUPATIONAL THERAPY INITIAL EVALUATION ADULT - LIVES WITH, PROFILE
Pt lives with wife, has 1 flight of stairs within home, bathoom includes both tub and walk-in shower with textured floor/spouse

## 2021-01-22 NOTE — OCCUPATIONAL THERAPY INITIAL EVALUATION ADULT - MANUAL MUSCLE TESTING RESULTS, REHAB EVAL
BUE 5/5 for shoulder flex, elbow flex/ext, forearm pro/sup, wrist flex/ext, digit ext, gross grasp; BLE 5/5 hip flex, knee flex/ext, plantar flex, dorsal flex/no strength deficits were identified

## 2021-01-22 NOTE — DISCHARGE NOTE PROVIDER - CARE PROVIDER_API CALL
Mauor Frey)  Orthopedics  [address] [address]  Phone: [phone]  Fax: [phone]  Follow Up Time: 2 weeks

## 2021-01-22 NOTE — PROGRESS NOTE ADULT - SUBJECTIVE AND OBJECTIVE BOX
Ortho Note      Subjective:  Pt comfortable without complaints, pain controlled with current pain medication regimen.   Denies CP, SOB, N/V, numbness/tingling       Vital Signs Last 24 Hrs  T(C): 36.6 (01-22-21 @ 08:41), Max: 36.7 (01-22-21 @ 06:06)  T(F): 97.9 (01-22-21 @ 08:41), Max: 98.1 (01-22-21 @ 06:06)  HR: 68 (01-22-21 @ 08:41) (67 - 68)  BP: 112/62 (01-22-21 @ 08:41) (99/65 - 112/62)  BP(mean): --  RR: 19 (01-22-21 @ 08:41) (18 - 19)  SpO2: 97% (01-22-21 @ 08:41) (96% - 97%)  AVSS    Objective:    Physical Exam:  General: Pt Alert and oriented, NAD  prineo DSG C/D/I x1 hemovac present  Pulses: +2 intact, wwp less than 3 seconds, cap refill less than 3 seconds  Sensation: SILT bilateral upper and lower extremities  Motor: EHL/FHL/TA/GS 5/5                          12.8   9.15  )-----------( 176      ( 21 Jan 2021 14:29 )             39.0   21 Jan 2021 14:18    140    |  106    |  14     ----------------------------<  139    3.8     |  22     |  0.82           Plan of Care:  A/P: 60yMale POD#1 s/p C5 corpectomy, partial C6 corpectomy, C4-C6 anterior fusion with cage/plate   - Stable- afebrile non toxic apperance  - Pain Control - tylenol 1000mg PO Q8h, methocarbamol 500mg Po Q8h, lyrica 50mg Po TID, Oxycodone 5mg PO Q4h prn severe pain   - DVT ppx: SCDS  - PT, WBS:  WBAT  - monitor drain output  - bowel regimen, IS use, oob to chair  -  d/c flynn monitor post d/c flynn residual  - Dispo: home pending drain d/c    Ortho Pager [phone] Ortho Note      Subjective:  Pt comfortable without complaints, pain controlled with current pain medication regimen.   Denies CP, SOB, N/V, numbness/tingling       Vital Signs Last 24 Hrs  T(C): 36.6 (01-22-21 @ 08:41), Max: 36.7 (01-22-21 @ 06:06)  T(F): 97.9 (01-22-21 @ 08:41), Max: 98.1 (01-22-21 @ 06:06)  HR: 68 (01-22-21 @ 08:41) (67 - 68)  BP: 112/62 (01-22-21 @ 08:41) (99/65 - 112/62)  BP(mean): --  RR: 19 (01-22-21 @ 08:41) (18 - 19)  SpO2: 97% (01-22-21 @ 08:41) (96% - 97%)  AVSS    Objective:    Physical Exam:  General: Pt Alert and oriented, NAD  prineo with DSG C/D/I x1 hemovac present  Pulses: +2 intact, wwp less than 3 seconds, cap refill less than 3 seconds  Sensation: SILT bilateral upper  extremities  Motor: bilateral upper extremities 5/5 (, bicep tricep, deltoid)                          12.8   9.15  )-----------( 176      ( 21 Jan 2021 14:29 )             39.0   21 Jan 2021 14:18    140    |  106    |  14     ----------------------------<  139    3.8     |  22     |  0.82           Plan of Care:  A/P: 60yMale POD#1 s/p C5 corpectomy, partial C6 corpectomy, C4-C6 anterior fusion with cage/plate   - Stable- afebrile non toxic appearance  - Pain Control - tylenol 1000mg PO Q8h, methocarbamol 500mg Po Q8h, lyrica 50mg Po TID, Oxycodone 5mg PO Q4h prn severe pain   - DVT ppx: SCDS  - PT, WBS:  WBAT  - monitor drain output  - bowel regimen, IS use, oob to chair  -  d/c flynn monitor post d/c flynn residual  - Dispo: home pending drain output    Ortho Pager [phone]

## 2021-01-25 ENCOUNTER — NON-APPOINTMENT (OUTPATIENT)
Age: 61
End: 2021-01-25

## 2021-01-27 DIAGNOSIS — G95.89 OTHER SPECIFIED DISEASES OF SPINAL CORD: ICD-10-CM

## 2021-01-27 DIAGNOSIS — I73.9 PERIPHERAL VASCULAR DISEASE, UNSPECIFIED: ICD-10-CM

## 2021-01-27 DIAGNOSIS — E78.5 HYPERLIPIDEMIA, UNSPECIFIED: ICD-10-CM

## 2021-01-27 DIAGNOSIS — I95.9 HYPOTENSION, UNSPECIFIED: ICD-10-CM

## 2021-01-27 DIAGNOSIS — I25.10 ATHEROSCLEROTIC HEART DISEASE OF NATIVE CORONARY ARTERY WITHOUT ANGINA PECTORIS: ICD-10-CM

## 2021-01-27 DIAGNOSIS — T38.0X5A ADVERSE EFFECT OF GLUCOCORTICOIDS AND SYNTHETIC ANALOGUES, INITIAL ENCOUNTER: ICD-10-CM

## 2021-01-27 DIAGNOSIS — M50.121 CERVICAL DISC DISORDER AT C4-C5 LEVEL WITH RADICULOPATHY: ICD-10-CM

## 2021-01-27 DIAGNOSIS — Z98.890 OTHER SPECIFIED POSTPROCEDURAL STATES: ICD-10-CM

## 2021-01-27 DIAGNOSIS — M48.02 SPINAL STENOSIS, CERVICAL REGION: ICD-10-CM

## 2021-01-27 DIAGNOSIS — E09.65 DRUG OR CHEMICAL INDUCED DIABETES MELLITUS WITH HYPERGLYCEMIA: ICD-10-CM

## 2021-01-27 DIAGNOSIS — M50.021 CERVICAL DISC DISORDER AT C4-C5 LEVEL WITH MYELOPATHY: ICD-10-CM

## 2021-01-27 DIAGNOSIS — I34.0 NONRHEUMATIC MITRAL (VALVE) INSUFFICIENCY: ICD-10-CM

## 2021-02-10 ENCOUNTER — OUTPATIENT (OUTPATIENT)
Dept: OUTPATIENT SERVICES | Facility: HOSPITAL | Age: 61
LOS: 1 days | End: 2021-02-10
Payer: COMMERCIAL

## 2021-02-10 ENCOUNTER — RESULT REVIEW (OUTPATIENT)
Age: 61
End: 2021-02-10

## 2021-02-10 ENCOUNTER — APPOINTMENT (OUTPATIENT)
Dept: ORTHOPEDIC SURGERY | Facility: CLINIC | Age: 61
End: 2021-02-10
Payer: COMMERCIAL

## 2021-02-10 VITALS — TEMPERATURE: 97.7 F

## 2021-02-10 DIAGNOSIS — S82.209A UNSPECIFIED FRACTURE OF SHAFT OF UNSPECIFIED TIBIA, INITIAL ENCOUNTER FOR CLOSED FRACTURE: Chronic | ICD-10-CM

## 2021-02-10 DIAGNOSIS — Z98.890 OTHER SPECIFIED POSTPROCEDURAL STATES: Chronic | ICD-10-CM

## 2021-02-10 DIAGNOSIS — Z41.9 ENCOUNTER FOR PROCEDURE FOR PURPOSES OTHER THAN REMEDYING HEALTH STATE, UNSPECIFIED: Chronic | ICD-10-CM

## 2021-02-10 PROBLEM — M54.12 RADICULOPATHY, CERVICAL REGION: Chronic | Status: ACTIVE | Noted: 2021-01-20

## 2021-02-10 PROBLEM — I73.9 PERIPHERAL VASCULAR DISEASE, UNSPECIFIED: Chronic | Status: ACTIVE | Noted: 2021-01-20

## 2021-02-10 PROBLEM — I25.10 ATHEROSCLEROTIC HEART DISEASE OF NATIVE CORONARY ARTERY WITHOUT ANGINA PECTORIS: Chronic | Status: ACTIVE | Noted: 2021-01-20

## 2021-02-10 PROBLEM — E78.5 HYPERLIPIDEMIA, UNSPECIFIED: Chronic | Status: ACTIVE | Noted: 2021-01-20

## 2021-02-10 PROBLEM — I34.0 NONRHEUMATIC MITRAL (VALVE) INSUFFICIENCY: Chronic | Status: ACTIVE | Noted: 2021-01-20

## 2021-02-10 PROCEDURE — 99024 POSTOP FOLLOW-UP VISIT: CPT

## 2021-02-10 PROCEDURE — 72040 X-RAY EXAM NECK SPINE 2-3 VW: CPT | Mod: 26

## 2021-02-10 PROCEDURE — 72040 X-RAY EXAM NECK SPINE 2-3 VW: CPT

## 2021-02-10 NOTE — HISTORY OF PRESENT ILLNESS
[de-identified] : 3 weeks post op [de-identified] : s/p C5 corpectomy, partial C6 corpectomy, anterior fusion with cage/plate C4-C6, doing well. Reports improvement in bilateral upper extremity paresthesias, right > left. Reports aching pain in neck and shoulders, improving. Denies dysphagia, denies dysphonia. Tolerating regular diet. Symptoms improved after course of decadron. Compliant with collar. Denies fever/chills/drainage from incision. Taking occasional percocet and muscle relaxant. [de-identified] : Well healing incision, no erythema, drainage or warmth\par 5/5 strength C4-T1 b/l\par SILT C4-T1 b/l\par wwp\par neg homans b/l [de-identified] : XR 2/9/21 (my read): hardware intact, good alignment, stable from intraop [de-identified] : s/p C5 corpectomy, partial C6 corpectomy, anterior fusion with cage/plate C4-C6, doing well.  [de-identified] : Dressing and suture tails removed\par Wound and activity instructions given\par Will given 1 refill of percocet 5/325mg, discussed weaning\par Follow up in 3-4 weeks, sooner if there is an issue\par XR cervical AP/lateral at that time\par All questions answered

## 2021-03-02 ENCOUNTER — RESULT REVIEW (OUTPATIENT)
Age: 61
End: 2021-03-02

## 2021-03-02 ENCOUNTER — OUTPATIENT (OUTPATIENT)
Dept: OUTPATIENT SERVICES | Facility: HOSPITAL | Age: 61
LOS: 1 days | End: 2021-03-02
Payer: COMMERCIAL

## 2021-03-02 ENCOUNTER — APPOINTMENT (OUTPATIENT)
Dept: ORTHOPEDIC SURGERY | Facility: CLINIC | Age: 61
End: 2021-03-02
Payer: MEDICARE

## 2021-03-02 VITALS — TEMPERATURE: 97.6 F

## 2021-03-02 DIAGNOSIS — Z41.9 ENCOUNTER FOR PROCEDURE FOR PURPOSES OTHER THAN REMEDYING HEALTH STATE, UNSPECIFIED: Chronic | ICD-10-CM

## 2021-03-02 DIAGNOSIS — S82.209A UNSPECIFIED FRACTURE OF SHAFT OF UNSPECIFIED TIBIA, INITIAL ENCOUNTER FOR CLOSED FRACTURE: Chronic | ICD-10-CM

## 2021-03-02 DIAGNOSIS — Z98.890 OTHER SPECIFIED POSTPROCEDURAL STATES: Chronic | ICD-10-CM

## 2021-03-02 PROCEDURE — 99024 POSTOP FOLLOW-UP VISIT: CPT

## 2021-03-02 PROCEDURE — 72040 X-RAY EXAM NECK SPINE 2-3 VW: CPT

## 2021-03-02 PROCEDURE — 72040 X-RAY EXAM NECK SPINE 2-3 VW: CPT | Mod: 26

## 2021-03-02 RX ORDER — OXYCODONE AND ACETAMINOPHEN 5; 325 MG/1; MG/1
5-325 TABLET ORAL
Qty: 20 | Refills: 0 | Status: DISCONTINUED | COMMUNITY
Start: 2021-02-10 | End: 2021-03-02

## 2021-03-02 RX ORDER — LIDOCAINE 5% 700 MG/1
5 PATCH TOPICAL
Qty: 14 | Refills: 1 | Status: DISCONTINUED | COMMUNITY
Start: 2021-01-27 | End: 2021-03-02

## 2021-03-02 RX ORDER — CYCLOBENZAPRINE HYDROCHLORIDE 5 MG/1
5 TABLET, FILM COATED ORAL 3 TIMES DAILY
Qty: 30 | Refills: 0 | Status: DISCONTINUED | COMMUNITY
Start: 2021-02-10 | End: 2021-03-02

## 2021-03-02 RX ORDER — CYCLOBENZAPRINE HYDROCHLORIDE 5 MG/1
5 TABLET, FILM COATED ORAL 3 TIMES DAILY
Qty: 21 | Refills: 0 | Status: DISCONTINUED | COMMUNITY
Start: 2021-01-27 | End: 2021-03-02

## 2021-03-02 RX ORDER — DICLOFENAC SODIUM AND MISOPROSTOL 50; 200 MG/1; UG/1
50-0.2 TABLET, DELAYED RELEASE ORAL
Qty: 60 | Refills: 0 | Status: DISCONTINUED | COMMUNITY
Start: 2020-10-20 | End: 2021-03-02

## 2021-03-02 RX ORDER — DEXAMETHASONE 4 MG/1
4 TABLET ORAL
Qty: 14 | Refills: 0 | Status: DISCONTINUED | COMMUNITY
Start: 2021-01-27 | End: 2021-03-02

## 2021-03-02 NOTE — HISTORY OF PRESENT ILLNESS
[de-identified] : 6 weeks post op [de-identified] : s/p C5 corpectomy, partial C6 corpectomy, anterior fusion with cage/plate C4-C6, doing well. Reports improvement in bilateral upper extremity paresthesias, right > left. No longer wakes from sleep. Denies dysphagia, denies dysphonia. Tolerating regular diet. Compliant with collar. Denies fever/chills/drainage from incision.  [de-identified] : Well healing incision, no erythema, drainage or warmth\par 5/5 strength C4-T1 b/l\par SILT C4-T1 b/l\par wwp\par neg homans b/l [de-identified] : XR 3/2/21: hardware intact, good alignment, stable from intraop [de-identified] : s/p C5 corpectomy, partial C6 corpectomy, anterior fusion with cage/plate C4-C6, doing well.  [de-identified] : Wound and activity instructions given\par Can wean from collar over next 2 weeks\par Follow up in 6 weeks, sooner if there is an issue\par XR cervical 4 view at that time\par All questions answered

## 2021-03-16 ENCOUNTER — APPOINTMENT (OUTPATIENT)
Dept: CT IMAGING | Facility: IMAGING CENTER | Age: 61
End: 2021-03-16
Payer: COMMERCIAL

## 2021-03-16 ENCOUNTER — OUTPATIENT (OUTPATIENT)
Dept: OUTPATIENT SERVICES | Facility: HOSPITAL | Age: 61
LOS: 1 days | End: 2021-03-16
Payer: COMMERCIAL

## 2021-03-16 DIAGNOSIS — S82.209A UNSPECIFIED FRACTURE OF SHAFT OF UNSPECIFIED TIBIA, INITIAL ENCOUNTER FOR CLOSED FRACTURE: Chronic | ICD-10-CM

## 2021-03-16 DIAGNOSIS — Z41.9 ENCOUNTER FOR PROCEDURE FOR PURPOSES OTHER THAN REMEDYING HEALTH STATE, UNSPECIFIED: Chronic | ICD-10-CM

## 2021-03-16 DIAGNOSIS — Z98.890 OTHER SPECIFIED POSTPROCEDURAL STATES: Chronic | ICD-10-CM

## 2021-03-16 DIAGNOSIS — Z00.8 ENCOUNTER FOR OTHER GENERAL EXAMINATION: ICD-10-CM

## 2021-03-16 PROCEDURE — 71250 CT THORAX DX C-: CPT | Mod: 26

## 2021-03-16 PROCEDURE — 71250 CT THORAX DX C-: CPT

## 2021-04-06 ENCOUNTER — APPOINTMENT (OUTPATIENT)
Dept: ORTHOPEDIC SURGERY | Facility: CLINIC | Age: 61
End: 2021-04-06
Payer: MEDICARE

## 2021-04-06 ENCOUNTER — RESULT REVIEW (OUTPATIENT)
Age: 61
End: 2021-04-06

## 2021-04-06 ENCOUNTER — OUTPATIENT (OUTPATIENT)
Dept: OUTPATIENT SERVICES | Facility: HOSPITAL | Age: 61
LOS: 1 days | End: 2021-04-06
Payer: COMMERCIAL

## 2021-04-06 VITALS — TEMPERATURE: 97.1 F

## 2021-04-06 DIAGNOSIS — S82.209A UNSPECIFIED FRACTURE OF SHAFT OF UNSPECIFIED TIBIA, INITIAL ENCOUNTER FOR CLOSED FRACTURE: Chronic | ICD-10-CM

## 2021-04-06 DIAGNOSIS — Z98.890 OTHER SPECIFIED POSTPROCEDURAL STATES: Chronic | ICD-10-CM

## 2021-04-06 DIAGNOSIS — Z41.9 ENCOUNTER FOR PROCEDURE FOR PURPOSES OTHER THAN REMEDYING HEALTH STATE, UNSPECIFIED: Chronic | ICD-10-CM

## 2021-04-06 PROCEDURE — 72050 X-RAY EXAM NECK SPINE 4/5VWS: CPT

## 2021-04-06 PROCEDURE — 72050 X-RAY EXAM NECK SPINE 4/5VWS: CPT | Mod: 26

## 2021-04-06 PROCEDURE — 99024 POSTOP FOLLOW-UP VISIT: CPT

## 2021-04-12 NOTE — HISTORY OF PRESENT ILLNESS
[de-identified] : 3 months post op [de-identified] : s/p C5 corpectomy, partial C6 corpectomy, anterior fusion with cage/plate C4-C6, doing well. Reports improvement in bilateral upper extremity paresthesias, right > left. No longer wakes from sleep. Denies dysphagia, denies dysphonia. Tolerating regular diet.  [de-identified] : Well healed incision\par 5/5 strength C4-T1 b/l\par SILT C4-T1 b/l\par wwp\par neg homans b/l [de-identified] : XR 4/6/21: hardware intact, good alignment, stable from intraop\par \par XR 3/2/21: hardware intact, good alignment, stable from intraop [de-identified] : s/p C5 corpectomy, partial C6 corpectomy, anterior fusion with cage/plate C4-C6, doing well.  [de-identified] : Activity instructions given\par Follow up in 3 months, sooner if there is an issue\par XR cervical 4 view at that time\par All questions answered

## 2021-07-13 ENCOUNTER — APPOINTMENT (OUTPATIENT)
Dept: ORTHOPEDIC SURGERY | Facility: CLINIC | Age: 61
End: 2021-07-13
Payer: COMMERCIAL

## 2021-07-13 ENCOUNTER — OUTPATIENT (OUTPATIENT)
Dept: OUTPATIENT SERVICES | Facility: HOSPITAL | Age: 61
LOS: 1 days | End: 2021-07-13
Payer: COMMERCIAL

## 2021-07-13 ENCOUNTER — RESULT REVIEW (OUTPATIENT)
Age: 61
End: 2021-07-13

## 2021-07-13 DIAGNOSIS — Z98.890 OTHER SPECIFIED POSTPROCEDURAL STATES: Chronic | ICD-10-CM

## 2021-07-13 DIAGNOSIS — S82.209A UNSPECIFIED FRACTURE OF SHAFT OF UNSPECIFIED TIBIA, INITIAL ENCOUNTER FOR CLOSED FRACTURE: Chronic | ICD-10-CM

## 2021-07-13 DIAGNOSIS — Z41.9 ENCOUNTER FOR PROCEDURE FOR PURPOSES OTHER THAN REMEDYING HEALTH STATE, UNSPECIFIED: Chronic | ICD-10-CM

## 2021-07-13 PROCEDURE — 72050 X-RAY EXAM NECK SPINE 4/5VWS: CPT | Mod: 26

## 2021-07-13 PROCEDURE — 72050 X-RAY EXAM NECK SPINE 4/5VWS: CPT

## 2021-07-13 PROCEDURE — 99214 OFFICE O/P EST MOD 30 MIN: CPT

## 2021-08-06 NOTE — DISCUSSION/SUMMARY
[de-identified] : Discussed my findings with the patient. Mr. Kaye is now 6 months s/p C5 corpectomy, partial C6 corpectomy, anterior fusion with cage/plate C4-C6, doing well. Reports improvement in bilateral upper extremity paresthesias, right > left from preop. XR stable. Activity instructions discussed. The patient will follow up with me at 1 year post op, sooner if there is an issue. XR cervical 4 view at that time. All questions answered.

## 2021-08-06 NOTE — HISTORY OF PRESENT ILLNESS
[de-identified] : Follow up 7/13/21: Patient now 6 months s/p C5 corpectomy, partial C6 corpectomy, anterior fusion with cage/plate C4-C6, doing well. Reports improvement in bilateral upper extremity paresthesias, right > left. No longer wakes from sleep. No new neurologic symptoms.\par \par

## 2021-08-06 NOTE — PHYSICAL EXAM
[de-identified] : Well healed incision\par 5/5 strength C4-T1 b/l\par SILT C4-T1 b/l\par wwp\par neg homans b/l. \par  [de-identified] : XR 7/13/21: hardware intact, good alignment, stable from intraop

## 2021-09-13 NOTE — DISCHARGE NOTE PROVIDER - NSDCQMSTAIRS_GEN_ALL_CORE
"Manas Fletcher reports continued lower back pain radiating to left leg. Rates 8/10, constant. She has tried ibuprofen with minimal relief. Per Ciaran Lake MD office note 8/31/21 ""Gave her prescription for diclofenac gel until she could try that on a p.r.n. basis over that painful area. She will keep scheduled follow-up with pain team.\""  She has not picked up prescription at this time. She will try that first and call back if pain does not improve.    " Yes

## 2021-12-09 ENCOUNTER — APPOINTMENT (OUTPATIENT)
Dept: DERMATOLOGY | Facility: CLINIC | Age: 61
End: 2021-12-09

## 2022-01-11 ENCOUNTER — APPOINTMENT (OUTPATIENT)
Dept: ORTHOPEDIC SURGERY | Facility: CLINIC | Age: 62
End: 2022-01-11
Payer: MEDICARE

## 2022-01-11 PROCEDURE — 72050 X-RAY EXAM NECK SPINE 4/5VWS: CPT

## 2022-01-11 PROCEDURE — 99214 OFFICE O/P EST MOD 30 MIN: CPT

## 2022-01-11 NOTE — DISCUSSION/SUMMARY
[de-identified] : Discussed my findings with the patient. Mr. Kaye is now 1 year s/p C5 corpectomy, partial C6 corpectomy, anterior fusion with cage/plate C4-C6, doing well. Reports improvement in bilateral preop upper extremity paresthesias and pains.  Left has completely resolved.  the right is much improved though has intermittent hand parasthesias thoguh does not cause him weakness or coordination difficulties.  XR stable. Prescription for non-contrast cervical spine MRI. Will f/u after MRI. If MRI is unremarkable, will f/u with EMG test to rule out diagnoses unrelated to cervical spine. All questions answered.

## 2022-01-11 NOTE — ADDENDUM
[FreeTextEntry1] : I, Beto Duke acting as a scribe for Dr. Mauro Frey MD on 01/11/2022 at 12:49 PM.\par

## 2022-01-11 NOTE — PHYSICAL EXAM
[de-identified] : Well healed incision\par 5/5 strength C4-T1 b/l\par SILT C4-T1 b/l\par wwp\par neg homans b/l. \par  [de-identified] : XR 1/11/22: Cervical XR with ap lateral flexion / extension views demonstrates C5 corpectomy with C4-C6 cage and plate. The hardware is in appropriate and unchanged position This region appears fused with no motion on flexion extension. There are no new significant developments at the other levels. His alignment is excellent.\par \par XR 7/13/21: hardware intact, good alignment, stable from intraop

## 2022-01-11 NOTE — HISTORY OF PRESENT ILLNESS
[de-identified] : Follow up 1/11/22:  Patient now 1 year s/p C5 corpectomy, partial C6 corpectomy, anterior fusion with cage/plate C4-C6, doing well. Patient confirms relief on right sided pain, numbness, paresthesia. Left side significantly improved pain, numbness, paresthesia. No new neurologic symptoms.\par \par Follow up 7/13/21: Patient now 6 months s/p C5 corpectomy, partial C6 corpectomy, anterior fusion with cage/plate C4-C6, doing well. Reports improvement in bilateral upper extremity paresthesias, right > left. No longer wakes from sleep. No new neurologic symptoms.\par \par

## 2022-01-24 ENCOUNTER — APPOINTMENT (OUTPATIENT)
Dept: MRI IMAGING | Facility: CLINIC | Age: 62
End: 2022-01-24
Payer: MEDICARE

## 2022-01-24 ENCOUNTER — OUTPATIENT (OUTPATIENT)
Dept: OUTPATIENT SERVICES | Facility: HOSPITAL | Age: 62
LOS: 1 days | End: 2022-01-24
Payer: MEDICARE

## 2022-01-24 DIAGNOSIS — S82.209A UNSPECIFIED FRACTURE OF SHAFT OF UNSPECIFIED TIBIA, INITIAL ENCOUNTER FOR CLOSED FRACTURE: Chronic | ICD-10-CM

## 2022-01-24 DIAGNOSIS — Z98.890 OTHER SPECIFIED POSTPROCEDURAL STATES: Chronic | ICD-10-CM

## 2022-01-24 DIAGNOSIS — Z41.9 ENCOUNTER FOR PROCEDURE FOR PURPOSES OTHER THAN REMEDYING HEALTH STATE, UNSPECIFIED: Chronic | ICD-10-CM

## 2022-01-24 DIAGNOSIS — Z98.1 ARTHRODESIS STATUS: ICD-10-CM

## 2022-01-24 PROCEDURE — G1004: CPT

## 2022-01-24 PROCEDURE — 72141 MRI NECK SPINE W/O DYE: CPT | Mod: ME

## 2022-01-24 PROCEDURE — 72141 MRI NECK SPINE W/O DYE: CPT | Mod: 26,ME

## 2022-01-25 ENCOUNTER — APPOINTMENT (OUTPATIENT)
Dept: ORTHOPEDIC SURGERY | Facility: CLINIC | Age: 62
End: 2022-01-25
Payer: MEDICARE

## 2022-01-25 DIAGNOSIS — G95.89 OTHER SPECIFIED DISEASES OF SPINAL CORD: ICD-10-CM

## 2022-01-25 PROCEDURE — 99214 OFFICE O/P EST MOD 30 MIN: CPT | Mod: 95

## 2022-01-25 RX ORDER — PITAVASTATIN CALCIUM 2.09 MG/1
2 TABLET, FILM COATED ORAL
Qty: 90 | Refills: 0 | Status: ACTIVE | COMMUNITY
Start: 2020-12-18

## 2022-01-25 NOTE — ADDENDUM
[FreeTextEntry1] : I, Beto Duke acting as a scribe for Dr. Mauro Frey MD on 01/25/2022 at 4:24 PM.\par \par

## 2022-01-25 NOTE — PHYSICAL EXAM
[de-identified] : No acute distress, A&O x3, respiration normal.\par Well healed incision\par At least 3/5 strength in all extremities b/l.\par Exam limited by TEB. [de-identified] : 1/24/22 MRI: Corpectomy C5 with anterior fusion instrumentation C4-C6 with excellent decompression of spinal cord in this area, there is myelomalacia and mild cord atrophy in this region that was evident pre-OP, C3/4 shows mild stenosis due to congenital spinal stenosis and disc bulging. C6/7 shows disc osteophyte complex disc degeneration and mild stenosis without cord compression or signal change. The other levels appear radiographically normal.\par \par XR 1/11/22: Cervical XR with ap lateral flexion / extension views demonstrates C5 corpectomy with C4-C6 cage and plate. The hardware is in appropriate and unchanged position This region appears fused with no motion on flexion extension. There are no new significant developments at the other levels. His alignment is excellent.\par \par XR 7/13/21: hardware intact, good alignment, stable from intraop

## 2022-01-25 NOTE — HISTORY OF PRESENT ILLNESS
[Home] : at home, [unfilled] , at the time of the visit. [Medical Office: ([facility])___] : at the medical office located in  [Verbal consent obtained from patient] : the patient, [unfilled] [de-identified] : 1/25/22: Patient presents for follow up. He is doing well, no neck pain. Reports mild tingling in left hand, right hand symptoms completely resolved. All symptoms are much improved from pre-OP including hand dexterity, paraesthesia, numbness, balance, and pain. Recently went for MRI of cervical spine and here to review.\par \par Follow up 1/11/22:  Patient now 1 year s/p C5 corpectomy, partial C6 corpectomy, anterior fusion with cage/plate C4-C6, doing well. Patient confirms relief on right sided pain, numbness, paresthesia. Left side significantly improved pain, numbness, paresthesia. No new neurologic symptoms.\par \par Follow up 7/13/21: Patient now 6 months s/p C5 corpectomy, partial C6 corpectomy, anterior fusion with cage/plate C4-C6, doing well. Reports improvement in bilateral upper extremity paresthesias, right > left. No longer wakes from sleep. No new neurologic symptoms.\par \par

## 2022-01-25 NOTE — DISCUSSION/SUMMARY
[de-identified] : Diagnosis: Cervical myelopathy much improved 1 year post op from C5 corpectomy and C4-6 anterior fusion/plating/cage.  Stable findings at other levels of the cervical spine.  Pt will continue with his normal activities, no restrictions at this time. Pt will f/u in 1 year with cervical 4 view XR at that time, sooner if needed. All questions were answered.\par

## 2022-01-25 NOTE — ASSESSMENT
[FreeTextEntry1] : Cervical myelopathy much improved 1 year post-OP with excellent decompression on recent MRI.

## 2022-03-22 ENCOUNTER — APPOINTMENT (OUTPATIENT)
Dept: ORTHOPEDIC SURGERY | Facility: CLINIC | Age: 62
End: 2022-03-22
Payer: MEDICARE

## 2022-03-22 ENCOUNTER — APPOINTMENT (OUTPATIENT)
Dept: ORTHOPEDIC SURGERY | Facility: CLINIC | Age: 62
End: 2022-03-22

## 2022-03-22 DIAGNOSIS — Z98.1 ARTHRODESIS STATUS: ICD-10-CM

## 2022-03-22 PROCEDURE — 99443: CPT | Mod: 95

## 2022-03-22 NOTE — REASON FOR VISIT
[Follow-Up Visit] : a follow-up visit for [Spouse] : spouse [Other:____] : [unfilled] [Verbal consent obtained from patient] : the patient, [unfilled] [Home] : at home, [unfilled] , at the time of the visit. [Medical Office: ([facility])___] : at the medical office located in

## 2022-03-23 PROBLEM — Z98.1 S/P CERVICAL SPINAL FUSION: Status: ACTIVE | Noted: 2021-02-10

## 2022-03-23 NOTE — DISCUSSION/SUMMARY
[de-identified] : Discussed my findings with the patient. We discussed that the foraminal stenosis at C6-7 is relatively stable compared to 2020. I do not see major changes, though he does have disc degeneration at C6-7 with associated foraminal stenosis. He does have moderate spinal stenosis as well at C3-4 with mild spinal cord deformation without signal change at that area, this is stable from presurgery in 2020. There is cord atrophy in the C5-6 region in area of prior severe compression as well as chronic signal change.  This is expected from the very severe stenosis and cord compression that was there previously. The C4-6 region looks quite well decompressed. We discussed that he does not have any restrictions at this point. He may return to exercise. With that said, I would not start high risk activities to the neck where contact would be expected and we discussed that. The patient should FU in 1 year with a new cervical XR at that time AP lateral flexion extension, sooner if needed. All questions answered.

## 2022-03-23 NOTE — ADDENDUM
[FreeTextEntry1] : Documented by Amanda Franco acting as scribe for Dr. Frey on 03/22/2022 \par \par All Medical record entries made by the Scribe were at my, Dr. Frey's, direction and personally dictated by me on 03/22/2022. I have reviewed the chart and agree that the record accurately reflects my personal performance of the history, physical exam, assessment and plan. I have also personally directed, reviewed, and agreed with the discharge instructions.

## 2022-03-23 NOTE — PHYSICAL EXAM
[de-identified] : 1/24/22 MRI: Corpectomy C5 with anterior fusion instrumentation C4-C6 with excellent decompression of spinal cord in this area, there is myelomalacia and mild cord atrophy in this region that was evident pre-OP, C3/4 shows mild stenosis due to congenital spinal stenosis and disc bulging. C6/7 shows disc osteophyte complex disc degeneration and mild stenosis without cord compression or signal change. The other levels appear radiographically normal.\par \par XR 1/11/22: Cervical XR with ap lateral flexion / extension views demonstrates C5 corpectomy with C4-C6 cage and plate. The hardware is in appropriate and unchanged position This region appears fused with no motion on flexion extension. There are no new significant developments at the other levels. His alignment is excellent.\par \par XR 7/13/21: hardware intact, good alignment, stable from intraop [de-identified] : Physical Exam:\par \par General: patient is well developed, well nourished, in no acute distress, alert and oriented x3.\par \par Mood and affect: normal\par \par Respiratory: no respiratory distress noted\par \par Skin: no scars over spine, skin intact, no erythema, increased warmth\par \par The exam was limited by telephonic visit.

## 2022-03-23 NOTE — HISTORY OF PRESENT ILLNESS
[Home] : at home, [unfilled] , at the time of the visit. [Medical Office: ([facility])___] : at the medical office located in  [Verbal consent obtained from patient] : the patient, [unfilled] [de-identified] : Follow up 3/22/22: Patient presents today for a follow up via telephonics. Time of telephone call including imaging review and documentation is 25 minutes. Patient last seen 1/25/22. Patient is doing quite well. He has no neck pain. Reports mild tingling in right hand, left hand symptoms completely resolved. All symptoms are much improved from pre-OP including hand dexterity, paraesthesia, numbness, balance, and pain. The patient, his wife and his son had questions regarding the MRI report and wish to discuss today.\par \par 1/25/22: Patient presents for follow up. He is doing well, no neck pain. Reports mild tingling in right hand, left hand symptoms completely resolved. All symptoms are much improved from pre-OP including hand dexterity, paraesthesia, numbness, balance, and pain. Recently went for MRI of cervical spine and here to review.\par \par Follow up 1/11/22:  Patient now 1 year s/p C5 corpectomy, partial C6 corpectomy, anterior fusion with cage/plate C4-C6, doing well. Patient confirms relief on right sided pain, numbness, paresthesia. Left side significantly improved pain, numbness, paresthesia. No new neurologic symptoms.\par \par Follow up 7/13/21: Patient now 6 months s/p C5 corpectomy, partial C6 corpectomy, anterior fusion with cage/plate C4-C6, doing well. Reports improvement in bilateral upper extremity paresthesias, right > left. No longer wakes from sleep. No new neurologic symptoms.\par \par

## 2022-07-27 ENCOUNTER — APPOINTMENT (OUTPATIENT)
Dept: CT IMAGING | Facility: IMAGING CENTER | Age: 62
End: 2022-07-27

## 2022-07-27 ENCOUNTER — OUTPATIENT (OUTPATIENT)
Dept: OUTPATIENT SERVICES | Facility: HOSPITAL | Age: 62
LOS: 1 days | End: 2022-07-27
Payer: MEDICARE

## 2022-07-27 DIAGNOSIS — Z00.8 ENCOUNTER FOR OTHER GENERAL EXAMINATION: ICD-10-CM

## 2022-07-27 DIAGNOSIS — Z41.9 ENCOUNTER FOR PROCEDURE FOR PURPOSES OTHER THAN REMEDYING HEALTH STATE, UNSPECIFIED: Chronic | ICD-10-CM

## 2022-07-27 DIAGNOSIS — Z98.890 OTHER SPECIFIED POSTPROCEDURAL STATES: Chronic | ICD-10-CM

## 2022-07-27 DIAGNOSIS — S82.209A UNSPECIFIED FRACTURE OF SHAFT OF UNSPECIFIED TIBIA, INITIAL ENCOUNTER FOR CLOSED FRACTURE: Chronic | ICD-10-CM

## 2022-07-27 PROCEDURE — 71250 CT THORAX DX C-: CPT | Mod: MH

## 2022-07-27 PROCEDURE — 71250 CT THORAX DX C-: CPT | Mod: 26,MH

## 2022-09-01 ENCOUNTER — NON-APPOINTMENT (OUTPATIENT)
Age: 62
End: 2022-09-01

## 2022-09-01 ENCOUNTER — APPOINTMENT (OUTPATIENT)
Dept: COLORECTAL SURGERY | Facility: CLINIC | Age: 62
End: 2022-09-01

## 2022-09-06 ENCOUNTER — LABORATORY RESULT (OUTPATIENT)
Age: 62
End: 2022-09-06

## 2022-09-06 ENCOUNTER — APPOINTMENT (OUTPATIENT)
Dept: GASTROENTEROLOGY | Facility: CLINIC | Age: 62
End: 2022-09-06

## 2022-09-06 VITALS
TEMPERATURE: 96 F | DIASTOLIC BLOOD PRESSURE: 90 MMHG | HEART RATE: 74 BPM | HEIGHT: 70 IN | RESPIRATION RATE: 15 BRPM | WEIGHT: 161 LBS | OXYGEN SATURATION: 98 % | SYSTOLIC BLOOD PRESSURE: 142 MMHG | BODY MASS INDEX: 23.05 KG/M2

## 2022-09-06 DIAGNOSIS — R19.5 OTHER FECAL ABNORMALITIES: ICD-10-CM

## 2022-09-06 PROCEDURE — 99204 OFFICE O/P NEW MOD 45 MIN: CPT

## 2022-09-06 NOTE — HISTORY OF PRESENT ILLNESS
[de-identified] : JOLYNN DE LEON is a 62 year M here for evaluation of positive cologuard. He has a history of CAD, HLD, and C spine surgery.\par \par GI History: Patient denies any history of GI issues or GI complaints. He underwent cologuard for routine screening.\par \par GI ROS negative for weight loss, fevers/chills, dysphagia, reflux, abdominal pain, bloating, nausea, vomiting, early satiety, diarrhea, constipation, melena, hematochezia. Patient denies NSAID use. Patient denies known family history of GI cancers.\par \par Current Meds: ASA, statin\par FHx: None, as above\par SHx: Former smoker, occasional EtOH. From [facility], in  30 years, former , son is in med school (former critical care nurse). \par \par Relevant Exam:\par Unremarkable\par \par Labs from 2020 reviewed, CBC, CMP, A1c reviewed. \par

## 2022-09-08 ENCOUNTER — NON-APPOINTMENT (OUTPATIENT)
Age: 62
End: 2022-09-08

## 2022-09-13 ENCOUNTER — NON-APPOINTMENT (OUTPATIENT)
Age: 62
End: 2022-09-13

## 2022-09-14 ENCOUNTER — APPOINTMENT (OUTPATIENT)
Dept: ORTHOPEDIC SURGERY | Facility: CLINIC | Age: 62
End: 2022-09-14

## 2022-09-14 VITALS — BODY MASS INDEX: 24.25 KG/M2 | WEIGHT: 160 LBS | RESPIRATION RATE: 16 BRPM | HEIGHT: 68 IN

## 2022-09-14 DIAGNOSIS — Z86.39 PERSONAL HISTORY OF OTHER ENDOCRINE, NUTRITIONAL AND METABOLIC DISEASE: ICD-10-CM

## 2022-09-14 DIAGNOSIS — Z78.9 OTHER SPECIFIED HEALTH STATUS: ICD-10-CM

## 2022-09-14 PROCEDURE — 73110 X-RAY EXAM OF WRIST: CPT | Mod: 50

## 2022-09-14 PROCEDURE — 99203 OFFICE O/P NEW LOW 30 MIN: CPT

## 2022-09-14 PROCEDURE — 99213 OFFICE O/P EST LOW 20 MIN: CPT

## 2022-09-14 RX ORDER — ASPIRIN 81 MG/1
81 TABLET ORAL
Refills: 0 | Status: ACTIVE | COMMUNITY

## 2022-09-19 ENCOUNTER — LABORATORY RESULT (OUTPATIENT)
Age: 62
End: 2022-09-19

## 2022-09-22 ENCOUNTER — RESULT REVIEW (OUTPATIENT)
Age: 62
End: 2022-09-22

## 2022-09-22 ENCOUNTER — OUTPATIENT (OUTPATIENT)
Dept: OUTPATIENT SERVICES | Facility: HOSPITAL | Age: 62
LOS: 1 days | Discharge: HOME | End: 2022-09-22
Payer: MEDICARE

## 2022-09-22 ENCOUNTER — APPOINTMENT (OUTPATIENT)
Dept: GASTROENTEROLOGY | Facility: HOSPITAL | Age: 62
End: 2022-09-22

## 2022-09-22 DIAGNOSIS — S82.209A UNSPECIFIED FRACTURE OF SHAFT OF UNSPECIFIED TIBIA, INITIAL ENCOUNTER FOR CLOSED FRACTURE: Chronic | ICD-10-CM

## 2022-09-22 DIAGNOSIS — Z41.9 ENCOUNTER FOR PROCEDURE FOR PURPOSES OTHER THAN REMEDYING HEALTH STATE, UNSPECIFIED: Chronic | ICD-10-CM

## 2022-09-22 DIAGNOSIS — Z98.890 OTHER SPECIFIED POSTPROCEDURAL STATES: Chronic | ICD-10-CM

## 2022-09-22 PROCEDURE — 45385 COLONOSCOPY W/LESION REMOVAL: CPT

## 2022-09-22 PROCEDURE — 88305 TISSUE EXAM BY PATHOLOGIST: CPT | Mod: 26

## 2022-09-22 PROCEDURE — C1889: CPT

## 2022-09-22 PROCEDURE — 88305 TISSUE EXAM BY PATHOLOGIST: CPT

## 2022-09-26 LAB — SURGICAL PATHOLOGY STUDY: SIGNIFICANT CHANGE UP

## 2022-09-28 ENCOUNTER — APPOINTMENT (OUTPATIENT)
Dept: ORTHOPEDIC SURGERY | Facility: CLINIC | Age: 62
End: 2022-09-28

## 2022-09-28 PROCEDURE — 99213 OFFICE O/P EST LOW 20 MIN: CPT | Mod: 25

## 2022-09-28 PROCEDURE — 20550 NJX 1 TENDON SHEATH/LIGAMENT: CPT

## 2022-09-29 ENCOUNTER — NON-APPOINTMENT (OUTPATIENT)
Age: 62
End: 2022-09-29

## 2022-10-27 ENCOUNTER — APPOINTMENT (OUTPATIENT)
Dept: GASTROENTEROLOGY | Facility: CLINIC | Age: 62
End: 2022-10-27

## 2023-01-09 ENCOUNTER — APPOINTMENT (OUTPATIENT)
Dept: VASCULAR SURGERY | Facility: CLINIC | Age: 63
End: 2023-01-09
Payer: MEDICARE

## 2023-01-09 VITALS — DIASTOLIC BLOOD PRESSURE: 86 MMHG | HEART RATE: 67 BPM | SYSTOLIC BLOOD PRESSURE: 134 MMHG

## 2023-01-09 DIAGNOSIS — I65.23 OCCLUSION AND STENOSIS OF BILATERAL CAROTID ARTERIES: ICD-10-CM

## 2023-01-09 DIAGNOSIS — I73.9 PERIPHERAL VASCULAR DISEASE, UNSPECIFIED: ICD-10-CM

## 2023-01-09 DIAGNOSIS — R42 DIZZINESS AND GIDDINESS: ICD-10-CM

## 2023-01-09 DIAGNOSIS — Z87.891 PERSONAL HISTORY OF NICOTINE DEPENDENCE: ICD-10-CM

## 2023-01-09 PROCEDURE — 93880 EXTRACRANIAL BILAT STUDY: CPT

## 2023-01-09 PROCEDURE — 99215 OFFICE O/P EST HI 40 MIN: CPT

## 2023-01-09 NOTE — PROCEDURE
[FreeTextEntry1] : Carotid duplex ordered to r/o progression of ICA stenosis, shows: bilt ICAs with <50% stenosis

## 2023-01-09 NOTE — ASSESSMENT
[Arterial/Venous Disease] : arterial/venous disease [Medication Management] : medication management [FreeTextEntry1] : 63 y/o M w/ mild PAD 2/2 known occluded right common iliac artery with reconstitution of the external and internal iliac arteries, asymptomatic. Also, known bilt mild ICA stenosis. Recent episode of dizziness possibly from cardiac etiology. \par \par On exam, no focal neurological deficits. LEs warm to touch bilaterally. Normal capillary refill in all toes. Skin intact. No edema. Bilt DP/PT biphasic doppler signals using handheld doppler. Palpable L pop and femoral pulses (baseline, nonpalpable on the R side). \par \par Discussed findings with pt. Recommended pt to continue to stay active; he should be walking at least 1 mile daily. Discussed importance of walking to continue to build collateral flow. He is also to continue taking ASA plus lowering cholesterol medication. Will contact PCP to discuss case as she wanted R MAUREEN occlusion to be further evaluated however, pt with no sings or symptoms of rest pain or claudication. Will contact pt after discussion with PCP with any further steps. F/u in 6 months or sooner if needed.

## 2023-01-09 NOTE — HISTORY OF PRESENT ILLNESS
[FreeTextEntry1] : 63 y/o M former smoker (for 40yrs, <1 pack/day, quit ~1.5 weeks ago) with HLD, h/o RLE injury with calcaneus bone fracture s/p repair by Dr. Figueroa, hx of chronic back issues, and PAD w/ RLE claudication 2/2 occluded right common iliac artery with reconstitution of the external and internal iliac arteries. Patient was originally referred by Dr. Wong. \par \par Today, he presents for evaluation of carotid arteries and PAD. "I feel fine". He explains his wife and son are very concerned after he had an episode of dizziness last week. It happened while sitting in the kitchen and lasted less than a couple of minutes. Denies any other neurological symptoms or TIA/CVA symptoms. He remembers having some plaque on the last ultrasound we had done here. His PCP also referred him to see a cardiologist and he has an upcoming appt. Recent EKG was WNL. he continues to take his aspiring and cholesterol lowering medication.\par \par Regarding his LEs, he reports he has not been walking daily. He says it is hard for him to go out for just a walk as he is busy running errands and also taking care of his grandchild. He still has  intermittent numbness sensation to b/l soles of his feet. Denies any skin changes, ulcerations, leg swelling , leg coolness, rest pain or claudication. \par \par Patient reports his son who was an ER Nurse is now in 3rd yr Medical School. Pt is very involved in his grandchild's care.\par \par SHx:\par Patient is retired but used to work as a . \par \par FHx:\par Pt's parents passed away from kidney problems and they both had T2DM.

## 2023-01-09 NOTE — PHYSICAL EXAM
[Respiratory Effort] : normal respiratory effort [2+] : left 2+ [No Rash or Lesion] : No rash or lesion [Alert] : alert [Oriented to Person] : oriented to person [Oriented to Place] : oriented to place [Oriented to Time] : oriented to time [Calm] : calm [1+] : left 1+ [0] : left 0 [JVD] : no jugular venous distention  [Ankle Swelling (On Exam)] : not present [Varicose Veins Of Lower Extremities] : not present [] : not present [de-identified] : Well appearing, NAD [de-identified] : NCAT [de-identified] : Supple [FreeTextEntry1] : LEs warm to touch bilaterally. Normal capillary refill in all toes. Skin intact. No edema. Bilt DP/PT biphasic doppler signals using handheld doppler.  [de-identified] : FROM

## 2023-01-09 NOTE — DATA REVIEWED
[FreeTextEntry1] : Abdominal CTA with runoff (07/09/2019)\par Impression: Occluded right common iliac artery with reconstitution of the external and internal iliac arteries.\par \par Carotid Duplex May 2019: bilt <50% stenosis of ICAs\par Art Duplex May 2019: bilt renal arteries patent, R occluded CFA however reconstitution at EIA noted. CFA patent. Negative AAA. Diffuse aortoiliac fibrocalcific plaque

## 2023-03-04 ENCOUNTER — EMERGENCY (EMERGENCY)
Facility: HOSPITAL | Age: 63
LOS: 1 days | Discharge: HOME | End: 2023-03-04
Admitting: EMERGENCY MEDICINE
Payer: MEDICARE

## 2023-03-04 VITALS
WEIGHT: 164.91 LBS | DIASTOLIC BLOOD PRESSURE: 65 MMHG | TEMPERATURE: 97 F | HEART RATE: 94 BPM | RESPIRATION RATE: 16 BRPM | OXYGEN SATURATION: 97 % | SYSTOLIC BLOOD PRESSURE: 114 MMHG

## 2023-03-04 VITALS
TEMPERATURE: 98 F | DIASTOLIC BLOOD PRESSURE: 77 MMHG | SYSTOLIC BLOOD PRESSURE: 121 MMHG | RESPIRATION RATE: 16 BRPM | OXYGEN SATURATION: 96 % | HEART RATE: 59 BPM

## 2023-03-04 DIAGNOSIS — S82.209A UNSPECIFIED FRACTURE OF SHAFT OF UNSPECIFIED TIBIA, INITIAL ENCOUNTER FOR CLOSED FRACTURE: Chronic | ICD-10-CM

## 2023-03-04 DIAGNOSIS — Z41.9 ENCOUNTER FOR PROCEDURE FOR PURPOSES OTHER THAN REMEDYING HEALTH STATE, UNSPECIFIED: Chronic | ICD-10-CM

## 2023-03-04 DIAGNOSIS — Z98.890 OTHER SPECIFIED POSTPROCEDURAL STATES: Chronic | ICD-10-CM

## 2023-03-04 LAB
ALBUMIN SERPL ELPH-MCNC: 4 G/DL — SIGNIFICANT CHANGE UP (ref 3.4–5)
ALP SERPL-CCNC: 109 U/L — SIGNIFICANT CHANGE UP (ref 40–120)
ALT FLD-CCNC: 18 U/L — SIGNIFICANT CHANGE UP (ref 12–42)
ANION GAP SERPL CALC-SCNC: 11 MMOL/L — SIGNIFICANT CHANGE UP (ref 9–16)
APPEARANCE UR: CLEAR — SIGNIFICANT CHANGE UP
AST SERPL-CCNC: 14 U/L — LOW (ref 15–37)
BACTERIA # UR AUTO: PRESENT /HPF
BASOPHILS # BLD AUTO: 0.05 K/UL — SIGNIFICANT CHANGE UP (ref 0–0.2)
BASOPHILS NFR BLD AUTO: 0.4 % — SIGNIFICANT CHANGE UP (ref 0–2)
BILIRUB DIRECT SERPL-MCNC: 0.2 MG/DL — SIGNIFICANT CHANGE UP (ref 0–0.3)
BILIRUB INDIRECT FLD-MCNC: 1.1 MG/DL — HIGH (ref 0.2–1)
BILIRUB SERPL-MCNC: 1.3 MG/DL — HIGH (ref 0.2–1.2)
BILIRUB UR-MCNC: NEGATIVE — SIGNIFICANT CHANGE UP
BUN SERPL-MCNC: 17 MG/DL — SIGNIFICANT CHANGE UP (ref 7–23)
CALCIUM SERPL-MCNC: 9.2 MG/DL — SIGNIFICANT CHANGE UP (ref 8.5–10.5)
CHLORIDE SERPL-SCNC: 105 MMOL/L — SIGNIFICANT CHANGE UP (ref 96–108)
CO2 SERPL-SCNC: 24 MMOL/L — SIGNIFICANT CHANGE UP (ref 22–31)
COLOR SPEC: YELLOW — SIGNIFICANT CHANGE UP
CREAT SERPL-MCNC: 1.01 MG/DL — SIGNIFICANT CHANGE UP (ref 0.5–1.3)
DIFF PNL FLD: ABNORMAL
EGFR: 83 ML/MIN/1.73M2 — SIGNIFICANT CHANGE UP
EOSINOPHIL # BLD AUTO: 0.34 K/UL — SIGNIFICANT CHANGE UP (ref 0–0.5)
EOSINOPHIL NFR BLD AUTO: 2.8 % — SIGNIFICANT CHANGE UP (ref 0–6)
GLUCOSE SERPL-MCNC: 111 MG/DL — HIGH (ref 70–99)
GLUCOSE UR QL: NEGATIVE — SIGNIFICANT CHANGE UP
HCT VFR BLD CALC: 46.3 % — SIGNIFICANT CHANGE UP (ref 39–50)
HGB BLD-MCNC: 15.7 G/DL — SIGNIFICANT CHANGE UP (ref 13–17)
IMM GRANULOCYTES NFR BLD AUTO: 0.3 % — SIGNIFICANT CHANGE UP (ref 0–0.9)
KETONES UR-MCNC: NEGATIVE — SIGNIFICANT CHANGE UP
LEUKOCYTE ESTERASE UR-ACNC: NEGATIVE — SIGNIFICANT CHANGE UP
LIDOCAIN IGE QN: 261 U/L — SIGNIFICANT CHANGE UP (ref 73–393)
LYMPHOCYTES # BLD AUTO: 1.63 K/UL — SIGNIFICANT CHANGE UP (ref 1–3.3)
LYMPHOCYTES # BLD AUTO: 13.6 % — SIGNIFICANT CHANGE UP (ref 13–44)
MCHC RBC-ENTMCNC: 31.7 PG — SIGNIFICANT CHANGE UP (ref 27–34)
MCHC RBC-ENTMCNC: 33.9 GM/DL — SIGNIFICANT CHANGE UP (ref 32–36)
MCV RBC AUTO: 93.3 FL — SIGNIFICANT CHANGE UP (ref 80–100)
MONOCYTES # BLD AUTO: 0.95 K/UL — HIGH (ref 0–0.9)
MONOCYTES NFR BLD AUTO: 7.9 % — SIGNIFICANT CHANGE UP (ref 2–14)
NEUTROPHILS # BLD AUTO: 8.97 K/UL — HIGH (ref 1.8–7.4)
NEUTROPHILS NFR BLD AUTO: 75 % — SIGNIFICANT CHANGE UP (ref 43–77)
NITRITE UR-MCNC: NEGATIVE — SIGNIFICANT CHANGE UP
NRBC # BLD: 0 /100 WBCS — SIGNIFICANT CHANGE UP (ref 0–0)
PH UR: 6 — SIGNIFICANT CHANGE UP (ref 5–8)
PLATELET # BLD AUTO: 218 K/UL — SIGNIFICANT CHANGE UP (ref 150–400)
POTASSIUM SERPL-MCNC: 3.6 MMOL/L — SIGNIFICANT CHANGE UP (ref 3.5–5.3)
POTASSIUM SERPL-SCNC: 3.6 MMOL/L — SIGNIFICANT CHANGE UP (ref 3.5–5.3)
PROT SERPL-MCNC: 7.9 G/DL — SIGNIFICANT CHANGE UP (ref 6.4–8.2)
PROT UR-MCNC: ABNORMAL MG/DL
RBC # BLD: 4.96 M/UL — SIGNIFICANT CHANGE UP (ref 4.2–5.8)
RBC # FLD: 11.9 % — SIGNIFICANT CHANGE UP (ref 10.3–14.5)
RBC CASTS # UR COMP ASSIST: ABNORMAL /HPF
SODIUM SERPL-SCNC: 140 MMOL/L — SIGNIFICANT CHANGE UP (ref 132–145)
SP GR SPEC: 1.02 — SIGNIFICANT CHANGE UP (ref 1–1.03)
UROBILINOGEN FLD QL: 1 E.U./DL — SIGNIFICANT CHANGE UP
WBC # BLD: 11.98 K/UL — HIGH (ref 3.8–10.5)
WBC # FLD AUTO: 11.98 K/UL — HIGH (ref 3.8–10.5)
WBC UR QL: < 5 /HPF — SIGNIFICANT CHANGE UP

## 2023-03-04 PROCEDURE — 74176 CT ABD & PELVIS W/O CONTRAST: CPT | Mod: 26,MH

## 2023-03-04 PROCEDURE — 99285 EMERGENCY DEPT VISIT HI MDM: CPT

## 2023-03-04 RX ORDER — MORPHINE SULFATE 50 MG/1
6 CAPSULE, EXTENDED RELEASE ORAL ONCE
Refills: 0 | Status: DISCONTINUED | OUTPATIENT
Start: 2023-03-04 | End: 2023-03-04

## 2023-03-04 RX ORDER — ONDANSETRON 8 MG/1
4 TABLET, FILM COATED ORAL ONCE
Refills: 0 | Status: COMPLETED | OUTPATIENT
Start: 2023-03-04 | End: 2023-03-04

## 2023-03-04 RX ORDER — TAMSULOSIN HYDROCHLORIDE 0.4 MG/1
0.4 CAPSULE ORAL ONCE
Refills: 0 | Status: COMPLETED | OUTPATIENT
Start: 2023-03-04 | End: 2023-03-04

## 2023-03-04 RX ORDER — TAMSULOSIN HYDROCHLORIDE 0.4 MG/1
1 CAPSULE ORAL
Qty: 7 | Refills: 0
Start: 2023-03-04 | End: 2023-03-10

## 2023-03-04 RX ORDER — MORPHINE SULFATE 50 MG/1
2 CAPSULE, EXTENDED RELEASE ORAL ONCE
Refills: 0 | Status: DISCONTINUED | OUTPATIENT
Start: 2023-03-04 | End: 2023-03-04

## 2023-03-04 RX ORDER — KETOROLAC TROMETHAMINE 30 MG/ML
15 SYRINGE (ML) INJECTION ONCE
Refills: 0 | Status: DISCONTINUED | OUTPATIENT
Start: 2023-03-04 | End: 2023-03-04

## 2023-03-04 RX ORDER — IBUPROFEN 200 MG
1 TABLET ORAL
Qty: 28 | Refills: 0
Start: 2023-03-04 | End: 2023-03-10

## 2023-03-04 RX ORDER — ONDANSETRON 8 MG/1
1 TABLET, FILM COATED ORAL
Qty: 9 | Refills: 0
Start: 2023-03-04 | End: 2023-03-06

## 2023-03-04 RX ORDER — SODIUM CHLORIDE 9 MG/ML
1000 INJECTION INTRAMUSCULAR; INTRAVENOUS; SUBCUTANEOUS ONCE
Refills: 0 | Status: COMPLETED | OUTPATIENT
Start: 2023-03-04 | End: 2023-03-04

## 2023-03-04 RX ADMIN — ONDANSETRON 4 MILLIGRAM(S): 8 TABLET, FILM COATED ORAL at 04:27

## 2023-03-04 RX ADMIN — TAMSULOSIN HYDROCHLORIDE 0.4 MILLIGRAM(S): 0.4 CAPSULE ORAL at 07:00

## 2023-03-04 RX ADMIN — Medication 15 MILLIGRAM(S): at 04:27

## 2023-03-04 RX ADMIN — MORPHINE SULFATE 2 MILLIGRAM(S): 50 CAPSULE, EXTENDED RELEASE ORAL at 04:59

## 2023-03-04 RX ADMIN — Medication 15 MILLIGRAM(S): at 07:01

## 2023-03-04 RX ADMIN — SODIUM CHLORIDE 1000 MILLILITER(S): 9 INJECTION INTRAMUSCULAR; INTRAVENOUS; SUBCUTANEOUS at 04:35

## 2023-03-04 NOTE — ED PROVIDER NOTE - OBJECTIVE STATEMENT
62 yo m pmhx sig for hld and h/o kidney stones pw acute onset of L flank pain radiating to the pubis with nausea w/o vomiting and hematuria ongoing for 3 hr in duration woke pt up from sleep, no vomiting, no dysuria, no fevers or chills.    I have reviewed available current nursing and previous documentation of past medical, surgical, family, and/or social history.

## 2023-03-04 NOTE — ED PROVIDER NOTE - PATIENT PORTAL LINK FT
You can access the FollowMyHealth Patient Portal offered by [facility] by registering at the following website: [email or web address]. By joining [facility]’s FollowMyHealth portal, you will also be able to view your health information using other applications (apps) compatible with our system.

## 2023-03-04 NOTE — ED PROVIDER NOTE - NS ED ROS FT
Review of Systems    Constitutional: (-) fever  Eyes/ENT: (-) change in vision, (-) sore throat, (-) ear pain  Cardiovascular: (-) chest pain, (-) palpitation   Respiratory: (-) cough, (-) shortness of breath  Gastrointestinal: (-) abdominal pain (-) vomiting, (-) diarrhea  Musculoskeletal: (-) neck pain, (-) back pain, (-) joint pain  Integumentary: (-) rash, (-) edema  Neurological: (-) headache, (-) altered mental status  Heme/Lymph: (-) easy bruising (-) easy bleeding (-) lymphadenopathy  Allergic/Immunologic: (-) pruritus
oriented to person, place, time and situation

## 2023-03-04 NOTE — ED PROVIDER NOTE - CARE PROVIDER_API CALL
Boni Wing)  Urology  [address] [place] [address]  Phone: [phone]  Fax: [phone]  Follow Up Time: 1-3 Days

## 2023-03-04 NOTE — ED PROVIDER NOTE - PHYSICAL EXAMINATION
Physical Exam    Vital Signs: I have reviewed the initial vital signs.  Constitutional: well-nourished, appears stated age  Eyes: PERRLA, and symmetrical lids.  ENT: Neck supple with no adenopathy, moist MM.  Cardiovascular: regular rate, regular rhythm, well-perfused extremities  Respiratory: unlabored respiratory effort, clear to auscultation bilaterally  Gastrointestinal: soft, non-tender abdomen, no cvat b/l  Musculoskeletal: supple neck, no lower extremity edema  Integumentary: warm, dry, no rash  Neurologic: extremities’ motor and sensory functions grossly intact  Psychiatric: A&Ox3e

## 2023-03-04 NOTE — ED PROVIDER NOTE - TOBACCO USE
Maria Elena Burton is a 13 m.o. 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 She was born on [birth year], and had bilious emesis and no passage of stool for the first three days of life. Barium enema was normal, so she was taken for exlap where she was found to have ileal stenosis. Surgical history is as follows:     9/2/20: ex lap for ileal stenosis. Creation of end ileostomy  11/3/20: ex lap for ileostomy takedown. Creation ileocolonic anastomosis  4/3/21: ex lap for adhesive small bowel obstruction. Resection of anastomosis, new ileocolic anastomosis, appendectomy  4/9/21: ex lap for anastomotic leak. Resection of anastomosis, new ileocolic anastomosis.  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 Resection of anastomosis, creation of end ileostomy  7/22/21: gastrostomy tube placement  9/23/21: ex lap for adhesive SBO. Resection of anastomosis, new ileocolic anastomosis     Prior to her most recent admission on 9/21/21, Maria Elena had been discharged to home on 8/26. At home, she was tolerating 4oz bolus feedings of EleCare formula q2h through the G tube, with continuous tube feeds at night. Upon admission, the patient had developed bilious emesis and abdominal distension. She was taken to the OR on 9/23 for adhesive SBO, and her ileocolonic anastomosis was once more taken down and re-created. She was given 7 days of zosyn and was started on TPN via a left femoral PICC line. Discussions among Dr. Hong ([place]) and Dr. 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    Never smoker Melolabial Interpolation Flap Text: A decision was made to reconstruct the defect utilizing an interpolation axial flap and a staged reconstruction.  A telfa template was made of the defect.  This telfa template was then used to outline the melolabial interpolation flap.  The donor area for the pedicle flap was then injected with anesthesia.  The flap was excised through the skin and subcutaneous tissue down to the layer of the underlying musculature.  The pedicle flap was carefully excised within this deep plane to maintain its blood supply.  The edges of the donor site were undermined.   The donor site was closed in a primary fashion.  The pedicle was then rotated into position and sutured.  Once the tube was sutured into place, adequate blood supply was confirmed with blanching and refill.  The pedicle was then wrapped with xeroform gauze and dressed appropriately with a telfa and gauze bandage to ensure continued blood supply and protect the attached pedicle.

## 2023-03-04 NOTE — ED PROVIDER NOTE - PROGRESS NOTE DETAILS
pain improved an dpt with son requesting discharge w/o final ct read, aware of incidental finding of enlarged prostate

## 2023-03-04 NOTE — ED PROVIDER NOTE - CLINICAL SUMMARY MEDICAL DECISION MAKING FREE TEXT BOX
well appearing pt here flank pain radiating to the pubis acute onset with nausea w/o vomiting and hematuria, likely due to renal calculi, although unlikely the following were considered appendicitis, diverticulitis, colitis, pyelonephritis, plan: cbc, bmp, ua, ucltx

## 2023-03-05 LAB
CULTURE RESULTS: SIGNIFICANT CHANGE UP
SPECIMEN SOURCE: SIGNIFICANT CHANGE UP

## 2023-03-06 DIAGNOSIS — R10.9 UNSPECIFIED ABDOMINAL PAIN: ICD-10-CM

## 2023-03-06 DIAGNOSIS — N20.0 CALCULUS OF KIDNEY: ICD-10-CM

## 2023-03-06 DIAGNOSIS — Z88.8 ALLERGY STATUS TO OTHER DRUGS, MEDICAMENTS AND BIOLOGICAL SUBSTANCES: ICD-10-CM

## 2023-03-06 DIAGNOSIS — Z87.442 PERSONAL HISTORY OF URINARY CALCULI: ICD-10-CM

## 2023-03-06 DIAGNOSIS — E78.5 HYPERLIPIDEMIA, UNSPECIFIED: ICD-10-CM

## 2023-03-06 DIAGNOSIS — Z88.0 ALLERGY STATUS TO PENICILLIN: ICD-10-CM

## 2023-03-06 DIAGNOSIS — N40.0 BENIGN PROSTATIC HYPERPLASIA WITHOUT LOWER URINARY TRACT SYMPTOMS: ICD-10-CM

## 2023-03-13 ENCOUNTER — APPOINTMENT (OUTPATIENT)
Dept: UROLOGY | Facility: CLINIC | Age: 63
End: 2023-03-13
Payer: MEDICARE

## 2023-03-13 VITALS
HEIGHT: 68 IN | WEIGHT: 160 LBS | DIASTOLIC BLOOD PRESSURE: 90 MMHG | SYSTOLIC BLOOD PRESSURE: 149 MMHG | OXYGEN SATURATION: 96 % | TEMPERATURE: 97.5 F | BODY MASS INDEX: 24.25 KG/M2 | HEART RATE: 75 BPM

## 2023-03-13 DIAGNOSIS — Z12.5 ENCOUNTER FOR SCREENING FOR MALIGNANT NEOPLASM OF PROSTATE: ICD-10-CM

## 2023-03-13 DIAGNOSIS — N47.1 PHIMOSIS: ICD-10-CM

## 2023-03-13 DIAGNOSIS — N20.1 CALCULUS OF URETER: ICD-10-CM

## 2023-03-13 LAB
BILIRUB UR QL STRIP: NORMAL
CLARITY UR: CLEAR
COLLECTION METHOD: NORMAL
GLUCOSE UR-MCNC: NORMAL
HCG UR QL: 0.2 EU/DL
HGB UR QL STRIP.AUTO: NORMAL
KETONES UR-MCNC: NORMAL
LEUKOCYTE ESTERASE UR QL STRIP: NORMAL
NITRITE UR QL STRIP: NORMAL
PH UR STRIP: 5.5
PROT UR STRIP-MCNC: NORMAL
SP GR UR STRIP: 1.02

## 2023-03-13 PROCEDURE — 99204 OFFICE O/P NEW MOD 45 MIN: CPT

## 2023-03-13 NOTE — PHYSICAL EXAM
[General Appearance - Well Developed] : well developed [Normal Appearance] : normal appearance [Heart Rate And Rhythm] : Heart rate and rhythm were normal [] : no respiratory distress [Abdomen Soft] : soft [Abdomen Tenderness] : non-tender [Abdomen Hernia] : no hernia was discovered [Costovertebral Angle Tenderness] : no ~M costovertebral angle tenderness [Urethral Meatus] : meatus normal [Epididymis] : the epididymides were normal [Testes Tenderness] : no tenderness of the testes [Testes Mass (___cm)] : there were no testicular masses [Prostate Tenderness] : the prostate was not tender [No Prostate Nodules] : no prostate nodules [Prostate Size ___ (0-4)] : prostate size [unfilled] (scale: 0-4) [Normal Station and Gait] : the gait and station were normal for the patient's age [Skin Color & Pigmentation] : normal skin color and pigmentation [No Focal Deficits] : no focal deficits [Oriented To Time, Place, And Person] : oriented to person, place, and time [FreeTextEntry1] : phimosis of foreskin

## 2023-03-13 NOTE — HISTORY OF PRESENT ILLNESS
[FreeTextEntry1] : 64 yo male presents for outpatient follow up after presenting to the [facility] ED 10 days ago with acute onset left flank pain and intermittent hematuria.  He had a CT scan which diagnosed him with a 1 mm left UVJ stone with mild hydronephrosis.  He has not had any symptoms in about 1 week.  He has not seen the stone pass.  He had a stone 22 years ago which he passed on his own.  He denies any family hx of stones.  He denies any LUTS.  He gets his PSA checked with his PCP on an annual basis.  .

## 2023-03-13 NOTE — ASSESSMENT
[FreeTextEntry1] : 64 yo male with 1 mm left UVJ stone, now asymptomatic for 1 week.  Patient also incidetnally8 noted ot have phimosis.\par \par The patient was seen and examined and results were reviewed. \par \par Kidney stone disease was reviewed with the patient and etiologies/risk factors were discussed. Preventative measures were discussed including hydration, diet modification, and medications.Options for management were reviewed including conservative management (no intervention), medical expulsive therapy, and surgical management. Merits and limitations of each option was discussed. \par \par The possibility of spontaneous stone passage based on stone size and location was reviewed. Risks of spontaneous passage including pain, hematuria, fever, chills, nausea/emesis, infection, urosepsis, dehydration, ureteral or renal injury, need for repeat office or emergency room visits, and permanent loss of renal function were reviewed.\par \par Surgical options were presented including ureteral stent placement, extracorporeal shockwave lithotripsy (ESWL),  and ureteroscopy with laser lithotripsy. The procedures were discussed in depth. Success rates, complications, and potential for subsequent procedures was discussed for each option.The role of imaging studies including ultrasound, plain film x-rays, and CT scan were discussed, as well as potential radiation exposure risks. Metabolic evaluation with serum chemistry and 24 hour urine collections were presented for the purpose of determining the etiology of the patient's stone forming risk factors.\par \par We will get a CT stone hunt (low dose radiation) to determine if the stone has passed. \par \par I discussed phimosis with him. He is not bothered by it and was not aware of it until I pointed it out.  \par \par Plan:\par 1. Ct stone hunt\par 2. F/u after # 1

## 2023-03-22 ENCOUNTER — APPOINTMENT (OUTPATIENT)
Dept: ORTHOPEDIC SURGERY | Facility: CLINIC | Age: 63
End: 2023-03-22
Payer: MEDICARE

## 2023-03-22 DIAGNOSIS — M79.89 OTHER SPECIFIED SOFT TISSUE DISORDERS: ICD-10-CM

## 2023-03-22 PROCEDURE — 99213 OFFICE O/P EST LOW 20 MIN: CPT | Mod: 25

## 2023-03-22 PROCEDURE — 20550 NJX 1 TENDON SHEATH/LIGAMENT: CPT | Mod: RT

## 2023-03-23 ENCOUNTER — NON-APPOINTMENT (OUTPATIENT)
Age: 63
End: 2023-03-23

## 2023-03-23 ENCOUNTER — APPOINTMENT (OUTPATIENT)
Dept: HEART AND VASCULAR | Facility: CLINIC | Age: 63
End: 2023-03-23
Payer: MEDICARE

## 2023-03-23 VITALS — DIASTOLIC BLOOD PRESSURE: 96 MMHG | SYSTOLIC BLOOD PRESSURE: 150 MMHG

## 2023-03-23 VITALS
DIASTOLIC BLOOD PRESSURE: 86 MMHG | SYSTOLIC BLOOD PRESSURE: 147 MMHG | BODY MASS INDEX: 24.86 KG/M2 | HEIGHT: 68 IN | HEART RATE: 72 BPM | WEIGHT: 164 LBS

## 2023-03-23 DIAGNOSIS — E78.2 MIXED HYPERLIPIDEMIA: ICD-10-CM

## 2023-03-23 PROCEDURE — 93306 TTE W/DOPPLER COMPLETE: CPT

## 2023-03-23 PROCEDURE — 93000 ELECTROCARDIOGRAM COMPLETE: CPT

## 2023-03-23 PROCEDURE — 99214 OFFICE O/P EST MOD 30 MIN: CPT | Mod: 25

## 2023-03-23 RX ORDER — MOMETASONE FUROATE 1 MG/G
0.1 CREAM TOPICAL
Qty: 45 | Refills: 0 | Status: DISCONTINUED | COMMUNITY
Start: 2021-11-24 | End: 2023-03-23

## 2023-03-23 RX ORDER — MELOXICAM 15 MG/1
15 TABLET ORAL
Qty: 14 | Refills: 3 | Status: DISCONTINUED | COMMUNITY
Start: 2022-09-14 | End: 2023-03-23

## 2023-03-23 RX ORDER — ESOMEPRAZOLE MAGNESIUM 40 MG/1
40 CAPSULE, DELAYED RELEASE ORAL
Qty: 30 | Refills: 0 | Status: DISCONTINUED | COMMUNITY
Start: 2021-10-06 | End: 2023-03-23

## 2023-03-23 RX ORDER — SULFAMETHOXAZOLE AND TRIMETHOPRIM 400; 80 MG/1; MG/1
400-80 TABLET ORAL TWICE DAILY
Qty: 14 | Refills: 0 | Status: DISCONTINUED | COMMUNITY
Start: 2022-09-14 | End: 2023-03-23

## 2023-03-23 RX ORDER — CHLORHEXIDINE GLUCONATE, 0.12% ORAL RINSE 1.2 MG/ML
0.12 SOLUTION DENTAL
Qty: 960 | Refills: 0 | Status: DISCONTINUED | COMMUNITY
Start: 2021-07-29 | End: 2023-03-23

## 2023-03-23 RX ORDER — PREDNISONE 20 MG/1
20 TABLET ORAL
Qty: 9 | Refills: 0 | Status: DISCONTINUED | COMMUNITY
Start: 2021-12-02 | End: 2023-03-23

## 2023-03-23 RX ORDER — ASPIRIN 81 MG/1
81 TABLET ORAL
Refills: 0 | Status: DISCONTINUED | COMMUNITY
End: 2023-03-23

## 2023-03-23 RX ORDER — LIDOCAINE HYDROCHLORIDE 20 MG/ML
2 SOLUTION ORAL; TOPICAL
Qty: 100 | Refills: 0 | Status: DISCONTINUED | COMMUNITY
Start: 2021-09-30 | End: 2023-03-23

## 2023-03-23 RX ORDER — PITAVASTATIN CALCIUM 4.18 MG/1
4 TABLET, FILM COATED ORAL
Qty: 1 | Refills: 1 | Status: DISCONTINUED | COMMUNITY
Start: 2020-08-25 | End: 2023-03-23

## 2023-03-23 RX ORDER — CLOTRIMAZOLE AND BETAMETHASONE DIPROPIONATE 10; .5 MG/G; MG/G
1-0.05 CREAM TOPICAL
Qty: 45 | Refills: 0 | Status: DISCONTINUED | COMMUNITY
Start: 2021-11-17 | End: 2023-03-23

## 2023-03-23 NOTE — PHYSICAL EXAM
[Well Developed] : well developed [Well Nourished] : well nourished [No Acute Distress] : no acute distress [Normal Conjunctiva] : normal conjunctiva [Normal Venous Pressure] : normal venous pressure [No Carotid Bruit] : no carotid bruit [Normal S1, S2] : normal S1, S2 [No Murmur] : no murmur [No Rub] : no rub [No Gallop] : no gallop [Clear Lung Fields] : clear lung fields [Good Air Entry] : good air entry [Soft] : abdomen soft [Non Tender] : non-tender [Normal Bowel Sounds] : normal bowel sounds [Normal Gait] : normal gait [No Edema] : no edema [No Cyanosis] : no cyanosis [No Clubbing] : no clubbing [No Rash] : no rash [Moves all extremities] : moves all extremities [Normal Speech] : normal speech [Alert and Oriented] : alert and oriented [No Respiratory Distress] : no respiratory distress

## 2023-03-23 NOTE — ASSESSMENT
[FreeTextEntry1] : EKG:NSR\par TTE:min AR,mild MR\par reviewed labs done recently\par SII=700ho%\par advised that given CAD/elevated calcium score that target LDL< 100mg%\par but he doesn't want to increase dose and he will continue Livalo 2mg for HLD\par advised antihypertensives- he doesn't want to- states not high all the time- advised that he should. continue asa for CAD

## 2023-03-23 NOTE — REASON FOR VISIT
[FreeTextEntry1] : Pt. is a 63 year old M, former smoker (for 40 years, <1 pack/day, quit early January 2023) with PMHx of CAD, PAD (RLE claudication 2/2 occluded right common iliac artery with reconstitution of the external and internal iliac arteries) and HLD who presents today to re-establish care since 01/04/2021.

## 2023-03-23 NOTE — HISTORY OF PRESENT ILLNESS
[FreeTextEntry1] : Since his last visit, \par \par \par Pt. denies any chest pain, dyspnea, orthopnea, PND, palpitations, lightheadedness, syncope or lower extremity edema. denies claudication- sees vascular\par states his BP has been high on occasion\par \par ===========================\par Labs/Diagnostics: \par \par 2022\par Lipid profile: T, TC: 216, HDL: 34, LDL: 145\par K+ 4.7\par Creat 0.80\par \par Echo (2021): mild MR, normal LVSF with EF 60%\par \par CCTA (): CAC score 262 (81%), moderate stenosis in pLCX, OM1 and D1, non-obstructive CAD in remaining segments; FFR negative

## 2023-05-03 NOTE — OCCUPATIONAL THERAPY INITIAL EVALUATION ADULT - LEVEL OF INDEPENDENCE: DRESS LOWER BODY, OT EVAL
independent Dermal Autograft Text: The defect edges were debeveled with a #15 scalpel blade.  Given the location of the defect, shape of the defect and the proximity to free margins a dermal autograft was deemed most appropriate.  Using a sterile surgical marker, the primary defect shape was transferred to the donor site. The area thus outlined was incised deep to adipose tissue with a #15 scalpel blade.  The harvested graft was then trimmed of adipose and epidermal tissue until only dermis was left.  The skin graft was then placed in the primary defect and oriented appropriately.

## 2023-09-21 ENCOUNTER — APPOINTMENT (OUTPATIENT)
Dept: HEART AND VASCULAR | Facility: CLINIC | Age: 63
End: 2023-09-21

## 2023-10-06 ENCOUNTER — APPOINTMENT (OUTPATIENT)
Dept: OTOLARYNGOLOGY | Facility: CLINIC | Age: 63
End: 2023-10-06

## 2023-10-13 ENCOUNTER — OUTPATIENT (OUTPATIENT)
Dept: OUTPATIENT SERVICES | Facility: HOSPITAL | Age: 63
LOS: 1 days | End: 2023-10-13
Payer: MEDICARE

## 2023-10-13 ENCOUNTER — APPOINTMENT (OUTPATIENT)
Dept: RADIOLOGY | Facility: CLINIC | Age: 63
End: 2023-10-13
Payer: MEDICARE

## 2023-10-13 DIAGNOSIS — Z00.8 ENCOUNTER FOR OTHER GENERAL EXAMINATION: ICD-10-CM

## 2023-10-13 DIAGNOSIS — Z98.890 OTHER SPECIFIED POSTPROCEDURAL STATES: Chronic | ICD-10-CM

## 2023-10-13 DIAGNOSIS — Z98.1 ARTHRODESIS STATUS: ICD-10-CM

## 2023-10-13 DIAGNOSIS — Z41.9 ENCOUNTER FOR PROCEDURE FOR PURPOSES OTHER THAN REMEDYING HEALTH STATE, UNSPECIFIED: Chronic | ICD-10-CM

## 2023-10-13 PROCEDURE — 73562 X-RAY EXAM OF KNEE 3: CPT

## 2023-10-13 PROCEDURE — 73562 X-RAY EXAM OF KNEE 3: CPT | Mod: 26,RT

## 2023-12-19 ENCOUNTER — APPOINTMENT (OUTPATIENT)
Dept: ORTHOPEDIC SURGERY | Facility: CLINIC | Age: 63
End: 2023-12-19
Payer: MEDICARE

## 2023-12-19 DIAGNOSIS — M48.02 SPINAL STENOSIS, CERVICAL REGION: ICD-10-CM

## 2023-12-19 PROCEDURE — 72050 X-RAY EXAM NECK SPINE 4/5VWS: CPT

## 2023-12-19 PROCEDURE — 99213 OFFICE O/P EST LOW 20 MIN: CPT

## 2023-12-20 NOTE — END OF VISIT
[FreeTextEntry3] :  All medical record entries made by the Scribe were at my, Dr. Mauro Frey, direction and personally dictated by me on 12/19/2023 I have reviewed the chart and agree that the record accurately reflects my personal performance of the history, physical exam, assessment and plan. I have also personally directed, reviewed, and agreed with the chart.

## 2023-12-20 NOTE — HISTORY OF PRESENT ILLNESS
[de-identified] : Follow-up 12/19/2023: 62y/o M Patient presents for follow-up. He was last seen in March of 2022. Here he reports left-sided neck pain for the past several weeks, comma, no accident or injury. He denies any pain radiating to his upper extremities, no upper extremity, numbness, weakness, paresthesia.

## 2023-12-20 NOTE — PHYSICAL EXAM
[de-identified] : No acute distress, ANO x3, respiration is normal, well-healed incision. 5 out of 5 strength C5-T1 bilaterally. SILT C5-T1 bilaterally.  [de-identified] :  XR cervical 4 view 12/19/2023 [my read]: Shows C5 corpectomy with C4 to C6 cage and plate, hardware in appropriate position unchanged from last study, appears well fused with no motion on flexion extension  from last views. Alignment is excellent.   1/24/22 MRI: Corpectomy C5 with anterior fusion instrumentation C4-C6 with excellent decompression of spinal cord in this area, there is myelomalacia and mild cord atrophy in this region that was evident pre-OP, C3/4 shows mild stenosis due to congenital spinal stenosis and disc bulging. C6/7 shows disc osteophyte complex disc degeneration and mild stenosis without cord compression or signal change. The other levels appear radiographically normal.  XR 1/11/22: Cervical XR with ap lateral flexion / extension views demonstrates C5 corpectomy with C4-C6 cage and plate. The hardware is in appropriate and unchanged position This region appears fused with no motion on flexion extension. There are no new significant developments at the other levels. His alignment is excellent.  XR 7/13/21: hardware intact, good alignment, stable from intraop

## 2023-12-20 NOTE — DISCUSSION/SUMMARY
[de-identified] : Discussing my findings with the patient. He was doing very well postoperatively until approximately several weeks ago when he began to experience some left-sided neck discomfort, no upper extremity radiating pain, numbness, weakness, paresthesia.   - X-ray is stable on exam.  - Activity instructions discussed. - He will follow up with me in three to four weeks if still having pain. Sooner if there is an issue - Would order an MRI cervical spine without contrast, and CT cervical spine without contrast at that time.  - Otherwise, follow up with me on an as-needed basis.  - All questions answered.

## 2024-03-14 ENCOUNTER — APPOINTMENT (OUTPATIENT)
Dept: HEART AND VASCULAR | Facility: CLINIC | Age: 64
End: 2024-03-14

## 2024-04-04 ENCOUNTER — APPOINTMENT (OUTPATIENT)
Dept: HEART AND VASCULAR | Facility: CLINIC | Age: 64
End: 2024-04-04
Payer: MEDICARE

## 2024-04-04 VITALS
HEIGHT: 68 IN | DIASTOLIC BLOOD PRESSURE: 81 MMHG | SYSTOLIC BLOOD PRESSURE: 129 MMHG | OXYGEN SATURATION: 98 % | WEIGHT: 165 LBS | BODY MASS INDEX: 25.01 KG/M2 | HEART RATE: 75 BPM

## 2024-04-04 DIAGNOSIS — I25.10 ATHEROSCLEROTIC HEART DISEASE OF NATIVE CORONARY ARTERY W/OUT ANGINA PECTORIS: ICD-10-CM

## 2024-04-04 DIAGNOSIS — E78.5 HYPERLIPIDEMIA, UNSPECIFIED: ICD-10-CM

## 2024-04-04 DIAGNOSIS — R03.0 ELEVATED BLOOD-PRESSURE READING, W/OUT DIAGNOSIS OF HYPERTENSION: ICD-10-CM

## 2024-04-04 DIAGNOSIS — I70.219 ATHEROSCLEROSIS OF NATIVE ARTERIES OF EXTREMITIES WITH INTERMITTENT CLAUDICATION, UNSPECIFIED EXTREMITY: ICD-10-CM

## 2024-04-04 DIAGNOSIS — R93.1 ABNORMAL FINDINGS ON DIAGNOSTIC IMAGING OF HEART AND CORONARY CIRCULATION: ICD-10-CM

## 2024-04-04 DIAGNOSIS — I34.0 NONRHEUMATIC MITRAL (VALVE) INSUFFICIENCY: ICD-10-CM

## 2024-04-04 PROCEDURE — 99214 OFFICE O/P EST MOD 30 MIN: CPT | Mod: 25

## 2024-04-04 PROCEDURE — 93000 ELECTROCARDIOGRAM COMPLETE: CPT

## 2024-04-04 PROCEDURE — 93306 TTE W/DOPPLER COMPLETE: CPT

## 2024-04-04 RX ORDER — VALSARTAN 40 MG/1
40 TABLET, COATED ORAL DAILY
Qty: 1 | Refills: 1 | Status: DISCONTINUED | COMMUNITY
Start: 2023-03-27 | End: 2024-04-04

## 2024-04-04 RX ORDER — DICLOFENAC SODIUM 50 MG/1
50 TABLET, DELAYED RELEASE ORAL DAILY
Qty: 30 | Refills: 2 | Status: DISCONTINUED | COMMUNITY
Start: 2023-12-19 | End: 2024-04-04

## 2024-04-04 NOTE — HISTORY OF PRESENT ILLNESS
[FreeTextEntry1] : 64 year old male, former smoker ( > 40 pack year, quit > 3 years ) with PMHX of HTN, HLD, Non Obs CAD(CCTA 2019),PAD (RLE claudication 2/2 occluded right common iliac artery with reconstitution of the external and internal iliac arteries) and Carotid Artery Dis and FMHX of CAD ( father MI 80's)  Patient is here today for concerns of elevated blood pressure. Last seen he was prescribed Valsartan however he was not taking it. He has noticed his blood pressures has been increasing since 2023. he is noticing it around mid 150's / 80'smmHg with associated pressure of his neck. He started his Valsartan and noticed an improvement. He took it for 3 days only .   He reports a low salt diet. He is not very active but he can walk up 3 flights of stairs without any chest pains or dyspnea. Patient denies any palpitations, shortness of breath at rest, dizziness, falls, syncope or neuro focal deficits.he has rare claudication and sees vascular( states testing was negative)   He has no edema.    Previous Work Up LABS ( 2024 )  , HDl 42,  LABS ( 2023) , HDL 39, , BUN / Cr 17/0.98 GFR 86 LABS (2022) Lipid profile: T, TC: 216, HDL: 34, LDL: 145, K+ 4.7, Creat 0.80  Echo ( 2023 )  1. The left ventricular systolic function is normal with an ejection fraction visually estimated at 60 to 65 %. 2. The left atrium is normal. 3. There is mild (grade 1) left ventricular diastolic dysfunction. 4. Normal right ventricular cavity size. 5. The right atrium is normal. 6. No pericardial effusion seen. 7. Tricuspid valve is structurally normal with no stenosis or significant regurgitation. 8. Trace aortic regurgitation. 9. Mild mitral regurgitation Echo (2021): mild MR, normal LVSF with EF 60%    CCTA (): CAC score 262 (81%), moderate stenosis in pLCX, OM1 and D1, non-obstructive CAD in remaining segments; FFR negative

## 2024-04-04 NOTE — DISCUSSION/SUMMARY
[FreeTextEntry1] : 64 year old male, former smoker ( > 40 pack year, quit > 3 years ) with PMHX of HTN, HLD, Non Obs CAD(CCTA 2019),PAD (RLE claudication 2/2 occluded right common iliac artery with reconstitution of the external and internal iliac arteries) and Carotid Artery Dis and FMHX of CAD ( father MI 80's). EKG:NSR,WNL Non Obs CAD- continue asa  HTN: Goal> doesn't want to take meds  valsartan- continue to monitor BP avoid salt/caffeine HLD: LDL Goal < 100. Last . Unable to tolerate rosuvastatin and lipitor. Diet discussed.  LDL not at goal( given calcium score- states had was off diet- continue livalo and repeat labs -if LDL still high would increase livalo- gave rX for labs advised to continue to walk for claudication and f/u with vascular

## 2024-04-22 NOTE — END OF VISIT
Peripheral IV  Date/Time: 4/22/2024 8:30 AM  Inserted by: Gorge Damon    Placement  Needle size: 18 G  Laterality: left  Location: hand  Local anesthetic: none  Site prep: chlorhexidine  Attempts: 1         [FreeTextEntry3] :  I personally obtained a history,performed a physical and outlined a plan of care

## 2024-07-01 NOTE — DISCHARGE NOTE PROVIDER - NSCORESITESY/N_GEN_A_CORE_RD
PI# [id number]    Attempted to call pt, no answer and VM is full    Labs will be discussed with pt at upcoming appt on 08/05   No

## 2024-08-06 ENCOUNTER — APPOINTMENT (OUTPATIENT)
Dept: UROLOGY | Facility: CLINIC | Age: 64
End: 2024-08-06

## 2024-08-06 ENCOUNTER — RESULT CHARGE (OUTPATIENT)
Age: 64
End: 2024-08-06

## 2024-08-06 PROBLEM — E78.00 HIGH BLOOD CHOLESTEROL LEVEL: Status: ACTIVE | Noted: 2024-08-06

## 2024-08-06 PROBLEM — R10.9 FLANK PAIN: Status: ACTIVE | Noted: 2024-08-06

## 2024-08-06 PROBLEM — N40.0 BPH WITHOUT OBSTRUCTION/LOWER URINARY TRACT SYMPTOMS: Status: ACTIVE | Noted: 2024-08-06

## 2024-08-06 PROBLEM — R30.0 DYSURIA: Status: ACTIVE | Noted: 2024-08-06

## 2024-08-06 PROBLEM — N20.0 NEPHROLITHIASIS: Status: ACTIVE | Noted: 2024-08-06

## 2024-08-06 PROCEDURE — 99214 OFFICE O/P EST MOD 30 MIN: CPT

## 2024-08-06 PROCEDURE — G2211 COMPLEX E/M VISIT ADD ON: CPT

## 2024-08-06 NOTE — ASSESSMENT
[FreeTextEntry1] : JOLYNN DE LEON is a 64 year old male who presents for consultation for chronic nephrolithiasis.  CT ap from March 2023 demonstrated 1 mm distal left ureteral stone along with punctate nephrolithiasis and multiple vascular renal calcifications.  Patient believes he passed the stone at that time.  1 week ago patient developed flank pain and dysuria, which is now resolved. We discussed differential diagnosis of the symptoms.  Will rule out chronic ureteral stone and obtain urine testing. We discussed limitations of sonogram and KUB for identifying ureteral stone.  Patient agrees to CT scan without contrast   -CT -ER precautions reviewed -Follow-up to review -PSA obtained last year will contact [facility] for results. After patient undergoes workup, will focus on prevention of chronic nephrolithiasis Litholink stone dietary prevention

## 2024-08-06 NOTE — ADDENDUM
[FreeTextEntry1] : I, Dr. Robertson, personally performed the evaluation and management (E/M) services for this established patient who presents today with (a) new problem(s)/exacerbation of (an) existing condition(s).  That E/M includes conducting the clinically appropriate interval history &/or exam, assessing all new/exacerbated conditions, and establishing a new plan of care.  Today, my KHOI, Shukri Hickey, was here to observe my evaluation and management service for this new problem/exacerbated condition and follow the plan of care established by me going forward.

## 2024-08-06 NOTE — HISTORY OF PRESENT ILLNESS
[FreeTextEntry1] : JOLYNN DE LEON is a 64 year old male who presents for consultation for renal stones.  He reports 20 years ago he had his first episode where he passed a stone on his own.  About a year ago he developed left-sided flank pain and presented to the emergency room at [facility] with a diagnosed him with a 1 mm distal left ureteral stone, which she states he passed on his own as the pain completely resolved.  He states he did not see the stone pass. Few weeks ago he began experiencing left-sided flank pain with dysuria.  He did not present to the emergency room and instead elected to take 1 pill of Percocet that he had in his home which resolved his pain and discomfort.  He states he never had any further pain afterwards. Overall, he is voiding well without any complaints.  Denies gross hematuria, dysuria or associated symptoms. Denies flank pain.  Denies  PMH including recurrent UTIs.  Has had ~2 episodes of renal colic secondary to obstructing nephrolithiasis without instrumentation Family History: No  malignancies Social History: Former cigarette smoker 1 pack/day x 45 years, quit 3 years ago.  Social alcohol use.  Denies illicit drug use.  Retired .  Son is emergency medicine resident at [employer]  Old records reviewed  CT from 3/23: Abdomen pelvis images reviewed by me and I agree with findings below.  There is perhaps a punctate nonobstructing stone however remainder of calcifications are vascular in the kidney IMPRESSION: Minimal left hydronephrosis with a 1 mm distal ureteral calculus near the UVJ.  Blood work from 8/1/2024: Lipid panel demonstrates elevated LDL at 113 HDL 38 and low CMP demonstrates a creatinine of 0.86 with EGFR of 95 Slightly elevated bilirubin at 1.6 AST/ALT within normal limits. Hemoglobin A1c 5.4 Uric acid 6 CBC within normal limits Urinalysis negative for RBCs or bacterial growth. PSA 1.98 August 2024

## 2024-08-28 ENCOUNTER — APPOINTMENT (OUTPATIENT)
Dept: CT IMAGING | Facility: CLINIC | Age: 64
End: 2024-08-28

## 2024-08-28 ENCOUNTER — OUTPATIENT (OUTPATIENT)
Dept: OUTPATIENT SERVICES | Facility: HOSPITAL | Age: 64
LOS: 1 days | End: 2024-08-28
Payer: MEDICARE

## 2024-08-28 DIAGNOSIS — S82.209A UNSPECIFIED FRACTURE OF SHAFT OF UNSPECIFIED TIBIA, INITIAL ENCOUNTER FOR CLOSED FRACTURE: Chronic | ICD-10-CM

## 2024-08-28 DIAGNOSIS — Z41.9 ENCOUNTER FOR PROCEDURE FOR PURPOSES OTHER THAN REMEDYING HEALTH STATE, UNSPECIFIED: Chronic | ICD-10-CM

## 2024-08-28 DIAGNOSIS — N20.1 CALCULUS OF URETER: ICD-10-CM

## 2024-08-28 DIAGNOSIS — Z98.890 OTHER SPECIFIED POSTPROCEDURAL STATES: Chronic | ICD-10-CM

## 2024-08-28 PROCEDURE — 74176 CT ABD & PELVIS W/O CONTRAST: CPT

## 2024-08-28 PROCEDURE — 74176 CT ABD & PELVIS W/O CONTRAST: CPT | Mod: 26,MH

## 2024-09-03 ENCOUNTER — NON-APPOINTMENT (OUTPATIENT)
Age: 64
End: 2024-09-03

## 2024-09-24 ENCOUNTER — APPOINTMENT (OUTPATIENT)
Dept: UROLOGY | Facility: CLINIC | Age: 64
End: 2024-09-24
Payer: MEDICARE

## 2024-09-24 VITALS
HEART RATE: 64 BPM | OXYGEN SATURATION: 96 % | WEIGHT: 165 LBS | BODY MASS INDEX: 25.01 KG/M2 | SYSTOLIC BLOOD PRESSURE: 130 MMHG | RESPIRATION RATE: 18 BRPM | HEIGHT: 68 IN | DIASTOLIC BLOOD PRESSURE: 79 MMHG

## 2024-09-24 DIAGNOSIS — N52.8 OTHER MALE ERECTILE DYSFUNCTION: ICD-10-CM

## 2024-09-24 DIAGNOSIS — N20.0 CALCULUS OF KIDNEY: ICD-10-CM

## 2024-09-24 PROCEDURE — 99214 OFFICE O/P EST MOD 30 MIN: CPT

## 2024-09-24 RX ORDER — TADALAFIL 20 MG/1
20 TABLET ORAL
Qty: 30 | Refills: 11 | Status: ACTIVE | COMMUNITY
Start: 2024-09-24 | End: 1900-01-01

## 2024-09-24 NOTE — ASSESSMENT
[FreeTextEntry1] : JOLYNN DE LEON is a 64-year-old male hx CAD, who presents for consultation for chronic nephrolithiasis.  CT ap from March 2023 demonstrated 1 mm distal left ureteral stone along with punctate nephrolithiasis and multiple vascular renal calcifications.  Patient believes he passed the stone at that time. No evidence of stones on CT scan  Patient complains of worsening erectile dysfunction.  States he had headache with sildenafil and wishes to try half tablet Cialis. - start Cialis PRN - f/u 6 months Recommended he continue to follow PSA with yearly PSA with primary care Recommend stone dietary prevention

## 2024-09-24 NOTE — HISTORY OF PRESENT ILLNESS
[FreeTextEntry1] : JOLYNN DE LEON is a 64-year-old male hx CAD, who presents for consultation for chronic nephrolithiasis.  CT ap from March 2023 demonstrated 1 mm distal left ureteral stone along with punctate nephrolithiasis and multiple vascular renal calcifications.  Patient believes he passed the stone at that time.  Pt reports ED. able to walk 2 point the stairs without any chest pain or shortness of breath.  States he exercises.  Denies flank pain, gross hematuria, dysuria or associated symptoms.   CT Renal Stone Ann 08/28/2024 - images independently reviewed by me - On my read, no stones seen I agree. Ureters trace bilaterally and no stones seen.  IMPRESSION: No evidence of hydronephrosis or renal calculi  PSA 08/27/2024 - 2.9 % free 28  UA 08/06/2024 - NEGATIVE  previously, He reports 20 years ago he had his first episode where he passed a stone on his own.  About a year ago he developed left-sided flank pain and presented to the emergency room at [facility] with a diagnosed him with a 1 mm distal left ureteral stone, which she states he passed on his own as the pain completely resolved.  He states he did not see the stone pass. Few weeks ago he began experiencing left-sided flank pain with dysuria.  He did not present to the emergency room and instead elected to take 1 pill of Percocet that he had in his home which resolved his pain and discomfort.  He states he never had any further pain afterwards. Overall, he is voiding well without any complaints.  Denies gross hematuria, dysuria or associated symptoms. Denies flank pain.  Denies  PMH including recurrent UTIs.  Has had ~2 episodes of renal colic secondary to obstructing nephrolithiasis without instrumentation Family History: No  malignancies Social History: Former cigarette smoker 1 pack/day x 45 years, quit 3 years ago.  Social alcohol use.  Denies illicit drug use.  Retired .  Son is emergency medicine resident at [employer]  Old records reviewed  CT from 3/23: Abdomen pelvis images reviewed by me and I agree with findings below.  There is perhaps a punctate nonobstructing stone however remainder of calcifications are vascular in the kidney IMPRESSION: Minimal left hydronephrosis with a 1 mm distal ureteral calculus near the UVJ.  Blood work from 8/1/2024: Lipid panel demonstrates elevated LDL at 113 HDL 38 and low CMP demonstrates a creatinine of 0.86 with EGFR of 95 Slightly elevated bilirubin at 1.6 AST/ALT within normal limits. Hemoglobin A1c 5.4 Uric acid 6 CBC within normal limits Urinalysis negative for RBCs or bacterial growth.

## 2024-09-24 NOTE — ADDENDUM
[FreeTextEntry1] : Patient's note was transcribed with the assistance of a medical scribe under the supervision of Dr. Robertson. I, Dr. Robertson, have reviewed the patient's chart and agree that it aligns with my medical decisions. Skip Rm, our scribe, also served as a chaperone for physical examination purposes.

## 2024-09-27 LAB
BILIRUB UR QL STRIP: NORMAL
COLLECTION METHOD: NORMAL
GLUCOSE UR-MCNC: NORMAL
HCG UR QL: 1 EU/DL
HGB UR QL STRIP.AUTO: NORMAL
KETONES UR-MCNC: NORMAL
LEUKOCYTE ESTERASE UR QL STRIP: NORMAL
NITRITE UR QL STRIP: NORMAL
PH UR STRIP: 6
PROT UR STRIP-MCNC: NORMAL
SP GR UR STRIP: 1.02

## 2025-02-27 ENCOUNTER — NON-APPOINTMENT (OUTPATIENT)
Age: 65
End: 2025-02-27

## 2025-02-27 ENCOUNTER — APPOINTMENT (OUTPATIENT)
Dept: HEART AND VASCULAR | Facility: CLINIC | Age: 65
End: 2025-02-27
Payer: MEDICARE

## 2025-02-27 VITALS
BODY MASS INDEX: 26.83 KG/M2 | TEMPERATURE: 97.5 F | HEIGHT: 68 IN | WEIGHT: 177 LBS | DIASTOLIC BLOOD PRESSURE: 82 MMHG | OXYGEN SATURATION: 98 % | SYSTOLIC BLOOD PRESSURE: 138 MMHG | HEART RATE: 74 BPM

## 2025-02-27 DIAGNOSIS — I10 ESSENTIAL (PRIMARY) HYPERTENSION: ICD-10-CM

## 2025-02-27 DIAGNOSIS — E78.2 MIXED HYPERLIPIDEMIA: ICD-10-CM

## 2025-02-27 DIAGNOSIS — R93.1 ABNORMAL FINDINGS ON DIAGNOSTIC IMAGING OF HEART AND CORONARY CIRCULATION: ICD-10-CM

## 2025-02-27 PROCEDURE — 99214 OFFICE O/P EST MOD 30 MIN: CPT

## 2025-02-27 PROCEDURE — G2211 COMPLEX E/M VISIT ADD ON: CPT

## 2025-02-27 PROCEDURE — 93000 ELECTROCARDIOGRAM COMPLETE: CPT

## 2025-02-27 RX ORDER — VALSARTAN 40 MG/1
40 TABLET, COATED ORAL
Refills: 0 | Status: ACTIVE | COMMUNITY

## 2025-03-12 ENCOUNTER — APPOINTMENT (OUTPATIENT)
Dept: HEART AND VASCULAR | Facility: CLINIC | Age: 65
End: 2025-03-12
Payer: MEDICARE

## 2025-03-12 DIAGNOSIS — R07.9 CHEST PAIN, UNSPECIFIED: ICD-10-CM

## 2025-03-12 PROCEDURE — 78452 HT MUSCLE IMAGE SPECT MULT: CPT

## 2025-03-12 PROCEDURE — A9500: CPT

## 2025-03-12 PROCEDURE — 93015 CV STRESS TEST SUPVJ I&R: CPT

## 2025-03-12 PROCEDURE — 96374 THER/PROPH/DIAG INJ IV PUSH: CPT | Mod: 59

## 2025-03-24 ENCOUNTER — APPOINTMENT (OUTPATIENT)
Dept: UROLOGY | Facility: CLINIC | Age: 65
End: 2025-03-24

## 2025-06-26 ENCOUNTER — APPOINTMENT (OUTPATIENT)
Dept: OTOLARYNGOLOGY | Facility: CLINIC | Age: 65
End: 2025-06-26
Payer: MEDICARE

## 2025-06-26 VITALS
SYSTOLIC BLOOD PRESSURE: 136 MMHG | HEART RATE: 77 BPM | TEMPERATURE: 97.9 F | DIASTOLIC BLOOD PRESSURE: 92 MMHG | BODY MASS INDEX: 25.62 KG/M2 | WEIGHT: 173 LBS | HEIGHT: 69 IN

## 2025-06-26 PROCEDURE — 99204 OFFICE O/P NEW MOD 45 MIN: CPT

## 2025-06-26 RX ORDER — IPRATROPIUM BROMIDE 21 UG/1
0.03 SPRAY, METERED NASAL TWICE DAILY
Qty: 1 | Refills: 2 | Status: ACTIVE | COMMUNITY
Start: 2025-06-26 | End: 1900-01-01

## (undated) DEVICE — SNARE CAPTIVATOR RND COLD STIFF 10X2.8MM